# Patient Record
Sex: FEMALE | Race: WHITE | NOT HISPANIC OR LATINO | Employment: OTHER | URBAN - METROPOLITAN AREA
[De-identification: names, ages, dates, MRNs, and addresses within clinical notes are randomized per-mention and may not be internally consistent; named-entity substitution may affect disease eponyms.]

---

## 2019-04-08 ENCOUNTER — HOSPITAL ENCOUNTER (OUTPATIENT)
Dept: OTHER | Facility: HOSPITAL | Age: 74
Discharge: HOME | End: 2019-04-08
Attending: PHYSICIAN ASSISTANT

## 2019-04-08 LAB
T4 FREE SERPL-MCNC: 1.8 NG/DL (ref 0.9–1.8)
TSH SERPL-ACNC: 2.54 M[IU]/L (ref 0.27–4.2)

## 2019-04-09 LAB
CONV THYROGLOBULIN ANTIBODY: <1 IU/ML (ref 0–0.9)
THYROGLOB SERPL-MCNC: 0.1 NG/ML (ref 1.5–38.5)

## 2019-10-01 ENCOUNTER — OFFICE VISIT CONVERTED (OUTPATIENT)
Dept: SURGERY | Facility: CLINIC | Age: 74
End: 2019-10-01
Attending: SURGERY

## 2019-10-08 ENCOUNTER — HOSPITAL ENCOUNTER (OUTPATIENT)
Dept: OTHER | Facility: HOSPITAL | Age: 74
Discharge: HOME | End: 2019-10-08
Attending: PHYSICIAN ASSISTANT

## 2019-10-08 LAB
T4 FREE SERPL-MCNC: 1.9 NG/DL (ref 0.9–1.8)
TSH SERPL-ACNC: 1.96 M[IU]/L (ref 0.27–4.2)

## 2019-10-14 LAB — THYROGLOB SERPL-MCNC: <0.5 NG/ML (ref 1.3–31.8)

## 2020-01-17 ENCOUNTER — CONVERSION ENCOUNTER (OUTPATIENT)
Dept: FAMILY MEDICINE CLINIC | Facility: CLINIC | Age: 75
End: 2020-01-17

## 2020-01-17 ENCOUNTER — OFFICE VISIT CONVERTED (OUTPATIENT)
Dept: FAMILY MEDICINE CLINIC | Facility: CLINIC | Age: 75
End: 2020-01-17
Attending: FAMILY MEDICINE

## 2020-04-27 ENCOUNTER — TELEPHONE CONVERTED (OUTPATIENT)
Dept: FAMILY MEDICINE CLINIC | Facility: CLINIC | Age: 75
End: 2020-04-27
Attending: FAMILY MEDICINE

## 2020-05-04 ENCOUNTER — HOSPITAL ENCOUNTER (OUTPATIENT)
Dept: GENERAL RADIOLOGY | Facility: HOSPITAL | Age: 75
Discharge: HOME | End: 2020-05-04
Attending: PHYSICIAN ASSISTANT

## 2020-05-04 LAB
ALBUMIN SERPL-MCNC: 4.1 G/DL (ref 3.5–5)
ALBUMIN/GLOB SERPL: 1.4 {RATIO} (ref 1.4–2.6)
ALP SERPL-CCNC: 80 U/L (ref 43–160)
ALT SERPL-CCNC: 18 U/L (ref 10–40)
ANION GAP SERPL CALC-SCNC: 18 MMOL/L (ref 8–19)
AST SERPL-CCNC: 22 U/L (ref 15–50)
BILIRUB SERPL-MCNC: 0.4 MG/DL (ref 0.2–1.3)
BUN SERPL-MCNC: 17 MG/DL (ref 5–25)
BUN/CREAT SERPL: 21 {RATIO} (ref 6–20)
CALCIUM SERPL-MCNC: 8.9 MG/DL (ref 8.7–10.4)
CHLORIDE SERPL-SCNC: 99 MMOL/L (ref 99–111)
CHOLEST SERPL-MCNC: 170 MG/DL (ref 107–200)
CHOLEST/HDLC SERPL: 2.7 {RATIO} (ref 3–6)
CONV CO2: 26 MMOL/L (ref 22–32)
CONV CREATININE URINE, RANDOM: 36.1 MG/DL (ref 10–300)
CONV MICROALBUM.,U,RANDOM: <12 MG/L (ref 0–20)
CONV TOTAL PROTEIN: 7.1 G/DL (ref 6.3–8.2)
CREAT UR-MCNC: 0.8 MG/DL (ref 0.5–0.9)
EST. AVERAGE GLUCOSE BLD GHB EST-MCNC: 140 MG/DL
GFR SERPLBLD BASED ON 1.73 SQ M-ARVRAT: >60 ML/MIN/{1.73_M2}
GLOBULIN UR ELPH-MCNC: 3 G/DL (ref 2–3.5)
GLUCOSE SERPL-MCNC: 140 MG/DL (ref 65–99)
HBA1C MFR BLD: 6.5 % (ref 3.5–5.7)
HDLC SERPL-MCNC: 63 MG/DL (ref 40–60)
LDLC SERPL CALC-MCNC: 91 MG/DL (ref 70–100)
MICROALBUMIN/CREAT UR: 33.2 MG/G{CRE} (ref 0–35)
OSMOLALITY SERPL CALC.SUM OF ELEC: 292 MOSM/KG (ref 273–304)
POTASSIUM SERPL-SCNC: 4.2 MMOL/L (ref 3.5–5.3)
SODIUM SERPL-SCNC: 139 MMOL/L (ref 135–147)
T4 FREE SERPL-MCNC: 1.9 NG/DL (ref 0.9–1.8)
TRIGL SERPL-MCNC: 78 MG/DL (ref 40–150)
TSH SERPL-ACNC: 0.56 M[IU]/L (ref 0.27–4.2)
VLDLC SERPL-MCNC: 16 MG/DL (ref 5–37)

## 2020-05-09 LAB — THYROGLOB SERPL-MCNC: <0.5 NG/ML (ref 1.3–31.8)

## 2020-10-15 ENCOUNTER — TELEPHONE CONVERTED (OUTPATIENT)
Dept: FAMILY MEDICINE CLINIC | Facility: CLINIC | Age: 75
End: 2020-10-15
Attending: FAMILY MEDICINE

## 2021-04-05 ENCOUNTER — HOSPITAL ENCOUNTER (OUTPATIENT)
Dept: GENERAL RADIOLOGY | Facility: HOSPITAL | Age: 76
Discharge: HOME | End: 2021-04-05
Attending: PHYSICIAN ASSISTANT

## 2021-04-05 LAB
ALBUMIN SERPL-MCNC: 3.7 G/DL (ref 3.5–5)
ALBUMIN/GLOB SERPL: 1.2 {RATIO} (ref 1.4–2.6)
ALP SERPL-CCNC: 73 U/L (ref 43–160)
ALT SERPL-CCNC: 17 U/L (ref 10–40)
ANION GAP SERPL CALC-SCNC: 12 MMOL/L (ref 8–19)
AST SERPL-CCNC: 21 U/L (ref 15–50)
BILIRUB SERPL-MCNC: 0.33 MG/DL (ref 0.2–1.3)
BUN SERPL-MCNC: 21 MG/DL (ref 5–25)
BUN/CREAT SERPL: 22 {RATIO} (ref 6–20)
CALCIUM SERPL-MCNC: 9 MG/DL (ref 8.7–10.4)
CHLORIDE SERPL-SCNC: 103 MMOL/L (ref 99–111)
CHOLEST SERPL-MCNC: 168 MG/DL (ref 107–200)
CHOLEST/HDLC SERPL: 2.5 {RATIO} (ref 3–6)
CONV CO2: 28 MMOL/L (ref 22–32)
CONV TOTAL PROTEIN: 6.7 G/DL (ref 6.3–8.2)
CREAT UR-MCNC: 0.96 MG/DL (ref 0.5–0.9)
EST. AVERAGE GLUCOSE BLD GHB EST-MCNC: 126 MG/DL
GFR SERPLBLD BASED ON 1.73 SQ M-ARVRAT: 58 ML/MIN/{1.73_M2}
GLOBULIN UR ELPH-MCNC: 3 G/DL (ref 2–3.5)
GLUCOSE SERPL-MCNC: 132 MG/DL (ref 65–99)
HBA1C MFR BLD: 6 % (ref 3.5–5.7)
HDLC SERPL-MCNC: 67 MG/DL (ref 40–60)
LDLC SERPL CALC-MCNC: 86 MG/DL (ref 70–100)
OSMOLALITY SERPL CALC.SUM OF ELEC: 293 MOSM/KG (ref 273–304)
POTASSIUM SERPL-SCNC: 4.1 MMOL/L (ref 3.5–5.3)
SODIUM SERPL-SCNC: 139 MMOL/L (ref 135–147)
T4 FREE SERPL-MCNC: 1.9 NG/DL (ref 0.9–1.8)
TRIGL SERPL-MCNC: 77 MG/DL (ref 40–150)
TSH SERPL-ACNC: 0.52 M[IU]/L (ref 0.27–4.2)
VLDLC SERPL-MCNC: 15 MG/DL (ref 5–37)

## 2021-04-11 LAB — THYROGLOB SERPL-MCNC: <0.5 NG/ML (ref 1.3–31.8)

## 2021-05-03 ENCOUNTER — HOSPITAL ENCOUNTER (OUTPATIENT)
Dept: GENERAL RADIOLOGY | Facility: HOSPITAL | Age: 76
Discharge: HOME | End: 2021-05-03
Attending: PHYSICIAN ASSISTANT

## 2021-05-03 LAB
T4 FREE SERPL-MCNC: 1.7 NG/DL (ref 0.9–1.8)
TSH SERPL-ACNC: 2.27 M[IU]/L (ref 0.27–4.2)

## 2021-05-12 NOTE — PROGRESS NOTES
Quick Note      Patient Name: Chinyere Fuchs   Patient ID: [id number]   Sex: Female   YOB: 1945    Primary Care Provider: Ney Nicole DO   Referring Provider: Ney Nicole DO    Visit Date: April 27, 2020    Provider: Ney Nicole DO   Location: [facility]   Location Address: [address] [place] [address]  [address] [place] [address]  [id number]   Location Phone: [phone]          History Of Present Illness  TELEHEALTH TELEPHONE VISIT  Chief Complaint: sinus pain pressure, HTN med refill   Chinyere Fuchs is a 74 year old /White female who is presenting for evaluation via telehealth telephone visit. Verbal consent obtained before beginning visit.   Provider spent 11 minutes with the patient during telehealth visit.   The following staff were present during this visit: Ney Nicole DO   Past Medical History/Overview of Patient Symptoms       Patient with phone call visit (attempt at teleconference on [employer] not able to go through and no email listed), complaining of allergy symptoms was last seen 1/2020.  PMH significant for HTN T2DM on Actos Ozempic Tradjenta as well as losartan/HCTZ.  Blood pressure was better than previous with some weight loss back in January of this year.  She is not prescribed any allergy medicine and when she takes that over-the-counter which she says she does    No chest pain palpitation shortness of breath fever just mild sinus drainage similar to anytime she is had before    Her medical records came in in February after being seen from Dr. Oreilly and Dr. Coon, had labs 4/2019 LDL was 114 metabolic panel otherwise good, urine microalbumin on 1/2019 35 A1c 7.4.    She has had imaging of her abdomen 11/2019 that noted hepatic cirrhosis mild splenomegaly which suggest portal hypertension    Mammogram found 2004, no bone scan or DEXA, had an EGD for esophageal varices 12/2019  Colonoscopy on 6/2018 with several polyps tubular  adenoma           Assessment  · Hypothyroidism     244.9/E03.9  · Allergic rhinitis, chronic     477.9  · HTN (hypertension)     401.9/I10  · T2DM (type 2 diabetes mellitus)     250.00/E11.9  · HLD (hyperlipidemia)     272.4/E78.5  · NAFLD (nonalcoholic fatty liver disease)     571.8/K76.0  · Screening for colon cancer     V76.51/Z12.11         T2DM  NAFLD  HTN  *Stable  Continue losartan and HCTZ will renew today  Advised monitor blood pressure at home follow-up as scheduled sooner if concerns  Labs were ordered from previous appointment in January can reorder if needed CMP A1c urine microalbumin lipid  Reviewed abdomen imaging shows hepatic cirrhosis and has a history of esophageal varices, recommend following up with GI and continue with Actos to help    Chronic allergic rhinitis  Sinusitis  *Acute on chronic  Continue with allergy medicines  Recommend amoxicillin to treat follow-up with no improvement    Recommend annual on his follow-up as scheduled labs before no DEXA in records, last mammogram in [facility] 2004 colonoscopy 2018 repeat 3 to 5 years or sooner     Problems Reconciled  Plan  · Orders  o Thyroid Profile (TSH, FT4) (02435, 75444, THYII) - 244.9/E03.9 - 04/27/2020  o Physician Telephone Evaluation, 11-20 minutes (95677) - 477.9, 250.00/E11.9, 272.4/E78.5, 571.8/K76.0 - 04/27/2020  o Diabetes 2 Panel (Urine Microalbumin, CMP, Lipid, A1c, ) [facility] (19023, 97256, 53410, 20994) - 250.00/E11.9, 272.4/E78.5 - 04/27/2020   to schedule lab apt or perform prior to AWV in next 3 months or less, if duplicated from previous labs cancel   o ACO-19: Colorectal cancer screening results documented and reviewed (3017F) - V76.51/Z12.11 - 04/27/2020 6/2018, x2 polyps repeat 3 years or sooner  · Medications  o amoxicillin 500 mg oral tablet   SIG: take 1 tablet (500 mg) by oral route every 12 hours for 10 days   DISP: (20) tablets with 0 refills  Prescribed on 04/27/2020     o hydrochlorothiazide 12.5 mg oral tablet    SIG: take 1 tablet (12.5 mg) by oral route once daily for 90 days   DISP: (90) tablets with 3 refills  Prescribed on 04/27/2020     o losartan 50 mg oral tablet   SIG: take 1 tablet (50 mg) by oral route once daily for 90 days   DISP: (90) tablets with 3 refills  Prescribed on 04/27/2020     o Medications have been Reconciled  · Instructions  o Plan Of Care:   o Chronic conditions reviewed and taken into consideration for today's treatment plan.  o Patient instructed to seek medical attention urgently for new or worsening symptoms.  o Patient is taking medications as prescribed and doing well.   o Take all medications as prescribed/directed.  o Patient instructed/educated on their diet and exercise program.  o Risks, benefits, and alternatives were discussed with the patient. The patient is aware of risks associated with:  o Discussed Covid-19 precautions including, but not limited to, social distancing, avoid touching your face, and hand washing.   · Disposition  o Call or Return if symptoms worsen or persist.  o Labs before follow up ordered  o Appointment Requested (36042) and Recall created (27976)  Careprovider : Ney Nicole DO ([id number])  Location : [facility]  Appointment : Annual Exam / Office Visit  Date : 3 months +/- 2 days  Override : No  Comments/Instructions : AWV if able, labs before reordered            Electronically Signed by: Ney Nicole DO -Author on April 27, 2020 01:55:54 PM

## 2021-05-13 NOTE — PROGRESS NOTES
Progress Note      Patient Name: Chinyere Fuchs   Patient ID: [id number]   Sex: Female   YOB: 1945    Primary Care Provider: Ney Nicole DO   Referring Provider: Ney Nicole DO    Visit Date: October 15, 2020    Provider: Ney Nicole DO   Location: [facility]   Location Address: [address] [place] [address]  [id number]   Location Phone: [phone]          Chief Complaint  · sinus pressure pain      History Of Present Illness  TELEHEALTH TELEPHONE VISIT  Chinyere Fuchs is a 74 year old /White female who is presenting for evaluation via telehealth telephone visit. Verbal consent obtained before beginning visit.   Provider spent 8 minutes with patient during telehealth visit.   The following staff were present during this visit: Ney Nicole DO   Past Medical History/Overview of Patient Symptoms  Chinyere Fuchs is a 74 year old /White female who presents for evaluation and treatment of:        Patient comes in planing of sinus pain and pressure has had for almost a week now and no improvement with over-the-counter medicines and taking the allergy medicines that she has.  She has past medical history significant for diabetes which puts her at high risk and denies any sick contacts fever chest pain loss of taste or smell       Past Medical History  Disease Name Date Onset Notes   Allergic rhinitis, chronic --  --    Bone Density Screening Refused --    Cataract --  --    HLD (hyperlipidemia) --  --    HTN (hypertension) --  --    Hypothyroidism --  --    Hypothyroidism (acquired) --  s/p total thyroidectomy 2015   OA (osteoarthritis) --  --    Obesity (BMI 30-39.9) --  --    Screening Mammogram Refused --    T2DM (type 2 diabetes mellitus) --  --          Past Surgical History  Procedure Name Date Notes   Cataract exctraction with lens implant 2012 --    Colonoscopy Refused --    EYE SURGERY 1-5-12 and 1-19-12 Patient  Lens Implant   Hysterectomy 2014 --    Knee repair 10/29/2013 R-knee   Thyroidectomy, Total 09/15/2015 --          Medication List  Name Date Started Instructions   Actos 15 mg oral tablet 2020 take 1 tablet (15 mg) by oral route once daily for 90 days   albuterol sulfate 90 mcg/actuation inhalation HFA aerosol inhaler 2020 inhale 2 puffs (180 mcg) by inhalation route every 4 hours as needed   hydrochlorothiazide 12.5 mg oral tablet 2020 take 1 tablet (12.5 mg) by oral route once daily for 90 days   levothyroxine 175 mcg oral tablet  take 1 tablet (175 mcg) by oral route once daily on an empty stomach 30 minutes before breakfast   losartan 50 mg oral tablet 2020 take 1 tablet (50 mg) by oral route once daily for 90 days   metoprolol tartrate 25 mg oral tablet 2020 take 1 tablet (25 mg) by oral route once daily for 30 days   Ozempic 0.25 mg or 0.5 mg(2 mg/1.5 mL) subcutaneous pen injector 2020 inject 0.5 milliliter by subcutaneous route once a week   Tradjenta 5 mg oral tablet 2020 take 1 tablet (5 mg) by oral route once daily for 90 days   Vision Formula-2 [id number] mg-unit-mg-mg oral capsule  take 1 capsule by oral route daily         Allergy List  Allergen Name Date Reaction Notes   Codiene --  --  --        Allergies Reconciled  Family Medical History  Disease Name Relative/Age Notes   Congestive Heart Failure Mother/   --    -Father's Family History Unknown Father/   Father         Reproductive History  Menstrual   Menopause Status: Postmenopausal Age Menopause: 50   Pregnancy Summary   Total Pregnancies: 1 Full Term: 1 Premature: 0   Ab Induced: 0 Ab Spontaneous: 0 Ectopics: 0   Multiples: 0 Livin         Social History  Finding Status Start/Stop Quantity Notes   Active but no formal exercise --  --/-- --  --    Advance Care Plan/Surrogate Decision Maker scanned into EMR --  --/-- --  --    Alcohol Never --/-- --  drinks no   Caffeine Current every day  --/-- --  drinks regularly; coffee; 1-2 times per day   Denies substance abuse --  --/-- --  --    Living will in place --  --/-- --  --    Second hand smoke exposure Never --/-- --  no   Tobacco Never --/-- --  never a smoker         Immunizations  NameDate Admin Mfg Trade Name Lot Number Route Inj VIS Given VIS Publication   Gbmholfct93/30/2019 SKB Fluarix, quadrivalent, preservative free [id number] NE NE 01/17/2020    Comments:    Rpvbuyfvqoif26/25/2014 [facility] PREVNAR 13 [id number] IM LD 11/25/2014    Comments: pt tolerated well         Review of Systems  · Constitutional  o Denies  o : fever, fatigue, weight loss, weight gain  · HENT  o Admits  o : sinus pain, nasal congestion, nasal discharge, postnasal drip  · Cardiovascular  o Denies  o : lower extremity edema, claudication, chest pressure, palpitations  · Respiratory  o Denies  o : shortness of breath, wheezing, cough, hemoptysis, dyspnea on exertion  · Gastrointestinal  o Denies  o : nausea, vomiting, diarrhea, constipation, abdominal pain  · Integument  o Denies  o : rash, itching  · Psychiatric  o Denies  o : anxiety, depression          Assessment  · Allergic rhinitis due to allergen     477.9/J30.9  · Sinusitis     473.9/J32.9  · T2DM (type 2 diabetes mellitus)     250.00/E11.9         Sinusitis  Chronic allergic rhinitis  Acute on chronic  Treat with amoxicillin  No fevers or sick contacts loss of taste smell and no chest pain so low likelihood of Covid, still precautions given advised to follow-up if there are concerns or findings as well as symptom changes  Follow-up if no improvement denies fever or other    T2DM  *Managing  Continue medicines and monitor blood sugars 164 this morning stable close monitoring given acute infection       Plan  · Orders  o ACO-39: Current medications updated and reviewed (1159F, ) - - 10/15/2020  alanis Duckworth Telephone evaluation, 5-10 min (28892) - 473.9/J32.9, 477.9/J30.9, 250.00/E11.9 -  10/15/2020  · Medications  o amoxicillin 875 mg oral tablet   SIG: take 1 tablet (875 mg) by oral route every 12 hours for 10 days   DISP: (20) Tablet with 0 refills  Prescribed on 10/15/2020     o Medications have been Reconciled  · Instructions  o Chronic conditions reviewed and taken into consideration for today's treatment plan.  o Patient instructed to seek medical attention urgently for new or worsening symptoms.  o Patient was educated/instructed on their diagnosis, treatment and medications prior to discharge from the clinic today.  o Risks, benefits, and alternatives were discussed with the patient. The patient is aware of risks associated with:  o Discussed Covid-19 precautions including, but not limited to, social distancing, avoid touching your face, and hand washing.   o Trusted Web sites were provided.  o Bring all medicines with their bottles to each office visit.  o Electronically Identified Patient Education Materials Provided Electronically  · Disposition  o Call or Return if symptoms worsen or persist.  o as scheduled  o Follow up later in week if no better            Electronically Signed by: Ney Nicole, DO -Author on October 15, 2020 10:36:58 AM

## 2021-05-15 VITALS
BODY MASS INDEX: 39.65 KG/M2 | HEART RATE: 79 BPM | SYSTOLIC BLOOD PRESSURE: 138 MMHG | RESPIRATION RATE: 16 BRPM | HEIGHT: 61 IN | OXYGEN SATURATION: 98 % | DIASTOLIC BLOOD PRESSURE: 76 MMHG | WEIGHT: 210 LBS

## 2021-05-15 VITALS — HEIGHT: 61 IN | WEIGHT: 214 LBS | BODY MASS INDEX: 40.4 KG/M2

## 2021-06-09 ENCOUNTER — TELEPHONE (OUTPATIENT)
Dept: FAMILY MEDICINE CLINIC | Facility: CLINIC | Age: 76
End: 2021-06-09

## 2021-06-09 NOTE — TELEPHONE ENCOUNTER
Caller: Chinyere Fuchs    Relationship: Self    Best call back number: [phone]    Medication needed: OZEMPIC - INJECT FOR DIABETES      When do you need the refill by: PATIENT IS OUT        Does the patient have less than a 3 day supply:  [x] Yes  [] No    What is the patient's preferred pharmacy:  [facility] [place], [place] - [facility] [address].  [phone] [facility] [phone]   [phone]

## 2021-06-10 RX ORDER — SEMAGLUTIDE 1.34 MG/ML
INJECTION, SOLUTION SUBCUTANEOUS
Qty: 1.5 ML | Refills: 1 | Status: SHIPPED | OUTPATIENT
Start: 2021-06-10 | End: 2021-07-12

## 2021-07-12 RX ORDER — PIOGLITAZONEHYDROCHLORIDE 15 MG/1
TABLET ORAL
Qty: 90 TABLET | Refills: 1 | Status: SHIPPED | OUTPATIENT
Start: 2021-07-12 | End: 2021-10-19

## 2021-07-12 RX ORDER — SEMAGLUTIDE 1.34 MG/ML
INJECTION, SOLUTION SUBCUTANEOUS
Qty: 1.5 ML | Refills: 1 | Status: SHIPPED | OUTPATIENT
Start: 2021-07-12 | End: 2021-07-26 | Stop reason: SDUPTHER

## 2021-07-12 RX ORDER — HYDROCHLOROTHIAZIDE 12.5 MG/1
TABLET ORAL
Qty: 90 TABLET | Refills: 3 | Status: SHIPPED | OUTPATIENT
Start: 2021-07-12 | End: 2022-07-11

## 2021-07-12 RX ORDER — LOSARTAN POTASSIUM 50 MG/1
TABLET ORAL
Qty: 90 TABLET | Refills: 3 | Status: SHIPPED | OUTPATIENT
Start: 2021-07-12

## 2021-07-26 RX ORDER — SEMAGLUTIDE 1.34 MG/ML
0.5 INJECTION, SOLUTION SUBCUTANEOUS WEEKLY
Qty: 1.5 ML | Refills: 2 | Status: SHIPPED | OUTPATIENT
Start: 2021-07-26 | End: 2021-12-20

## 2021-07-26 NOTE — TELEPHONE ENCOUNTER
DELETE AFTER REVIEWING: Send the encounter HIGH priority, If patient has less than a 3 day supply. If the patient will run out of medication over the weekend add that information to the additional details line. Send this encounter to the clinical pool.    Caller: Chinyere Fuchs    Relationship: Self    Best call back number:[phone]  Medication needed:   Requested Prescriptions     Pending Prescriptions Disp Refills   • Semaglutide,0.25 or 0.5MG/DOS, (Ozempic, 0.25 or 0.5 MG/DOSE,) 2 MG/1.5ML solution pen-injector 1.5 mL 1       When do you need the refill by: ASAP       What is the patient's preferred pharmacy:    [facility] - [place], [place] - [facility] [address] [facility].  [phone] [facility] [phone]   [phone]

## 2021-10-04 RX ORDER — ALBUTEROL SULFATE 90 UG/1
AEROSOL, METERED RESPIRATORY (INHALATION)
Qty: 18 G | Refills: 2 | Status: SHIPPED | OUTPATIENT
Start: 2021-10-04 | End: 2022-05-17

## 2021-10-04 NOTE — TELEPHONE ENCOUNTER
albuteral inhaler is not on med list, patient last seen on 10/15/2021 tele.please advise thank you

## 2021-10-14 ENCOUNTER — LAB (OUTPATIENT)
Dept: LAB | Facility: HOSPITAL | Age: 76
End: 2021-10-14

## 2021-10-14 ENCOUNTER — TRANSCRIBE ORDERS (OUTPATIENT)
Dept: ADMINISTRATIVE | Facility: HOSPITAL | Age: 76
End: 2021-10-14

## 2021-10-14 DIAGNOSIS — E03.9 HYPOTHYROIDISM, UNSPECIFIED TYPE: ICD-10-CM

## 2021-10-14 DIAGNOSIS — E03.9 HYPOTHYROIDISM, UNSPECIFIED TYPE: Primary | ICD-10-CM

## 2021-10-14 LAB
T3FREE SERPL-MCNC: 2.04 PG/ML (ref 2–4.4)
T4 FREE SERPL-MCNC: 1.68 NG/DL (ref 0.93–1.7)
TSH SERPL DL<=0.05 MIU/L-ACNC: 3.7 UIU/ML (ref 0.27–4.2)

## 2021-10-14 PROCEDURE — 36415 COLL VENOUS BLD VENIPUNCTURE: CPT

## 2021-10-14 PROCEDURE — 84481 FREE ASSAY (FT-3): CPT

## 2021-10-14 PROCEDURE — 84443 ASSAY THYROID STIM HORMONE: CPT

## 2021-10-14 PROCEDURE — 84439 ASSAY OF FREE THYROXINE: CPT

## 2021-10-19 RX ORDER — PIOGLITAZONEHYDROCHLORIDE 15 MG/1
TABLET ORAL
Qty: 90 TABLET | Refills: 1 | Status: SHIPPED | OUTPATIENT
Start: 2021-10-19

## 2021-11-23 ENCOUNTER — TRANSCRIBE ORDERS (OUTPATIENT)
Dept: LAB | Facility: HOSPITAL | Age: 76
End: 2021-11-23

## 2021-11-23 ENCOUNTER — LAB (OUTPATIENT)
Dept: LAB | Facility: HOSPITAL | Age: 76
End: 2021-11-23

## 2021-11-23 DIAGNOSIS — Z85.850 HX OF THYROID CANCER: Primary | ICD-10-CM

## 2021-11-23 DIAGNOSIS — Z85.850 HX OF THYROID CANCER: ICD-10-CM

## 2021-11-23 LAB
T4 FREE SERPL-MCNC: 1.89 NG/DL (ref 0.93–1.7)
TSH SERPL DL<=0.05 MIU/L-ACNC: 1.37 UIU/ML (ref 0.27–4.2)

## 2021-11-23 PROCEDURE — 84443 ASSAY THYROID STIM HORMONE: CPT

## 2021-11-23 PROCEDURE — 84432 ASSAY OF THYROGLOBULIN: CPT

## 2021-11-23 PROCEDURE — 36415 COLL VENOUS BLD VENIPUNCTURE: CPT

## 2021-11-23 PROCEDURE — 84439 ASSAY OF FREE THYROXINE: CPT

## 2021-11-29 LAB — THYROGLOB SERPL-MCNC: <2 NG/ML

## 2021-11-29 RX ORDER — LINAGLIPTIN 5 MG/1
TABLET, FILM COATED ORAL
Qty: 30 TABLET | Refills: 1 | Status: SHIPPED | OUTPATIENT
Start: 2021-11-29 | End: 2022-01-31

## 2021-12-20 RX ORDER — SEMAGLUTIDE 1.34 MG/ML
0.5 INJECTION, SOLUTION SUBCUTANEOUS WEEKLY
Qty: 1.5 ML | Refills: 2 | Status: SHIPPED | OUTPATIENT
Start: 2021-12-20 | End: 2022-02-16 | Stop reason: SDUPTHER

## 2022-01-31 RX ORDER — LINAGLIPTIN 5 MG/1
TABLET, FILM COATED ORAL
Qty: 30 TABLET | Refills: 1 | Status: SHIPPED | OUTPATIENT
Start: 2022-01-31 | End: 2022-04-05

## 2022-01-31 NOTE — TELEPHONE ENCOUNTER
Patient has not been seen for over a year. I called her and she is going to make a apt can you refill for 30 days?

## 2022-02-16 ENCOUNTER — OFFICE VISIT (OUTPATIENT)
Dept: FAMILY MEDICINE CLINIC | Facility: CLINIC | Age: 77
End: 2022-02-16

## 2022-02-16 VITALS
SYSTOLIC BLOOD PRESSURE: 132 MMHG | TEMPERATURE: 97.7 F | DIASTOLIC BLOOD PRESSURE: 68 MMHG | HEART RATE: 82 BPM | OXYGEN SATURATION: 99 % | HEIGHT: 61 IN | WEIGHT: 209.6 LBS | BODY MASS INDEX: 39.57 KG/M2

## 2022-02-16 DIAGNOSIS — E78.2 MIXED HYPERLIPIDEMIA: ICD-10-CM

## 2022-02-16 DIAGNOSIS — Z00.00 ENCOUNTER FOR SUBSEQUENT ANNUAL WELLNESS VISIT (AWV) IN MEDICARE PATIENT: Primary | ICD-10-CM

## 2022-02-16 DIAGNOSIS — E66.9 OBESITY (BMI 30-39.9): ICD-10-CM

## 2022-02-16 DIAGNOSIS — E03.9 HYPOTHYROIDISM (ACQUIRED): ICD-10-CM

## 2022-02-16 DIAGNOSIS — I10 PRIMARY HYPERTENSION: Chronic | ICD-10-CM

## 2022-02-16 DIAGNOSIS — E11.65 TYPE 2 DIABETES MELLITUS WITH HYPERGLYCEMIA, WITHOUT LONG-TERM CURRENT USE OF INSULIN: Chronic | ICD-10-CM

## 2022-02-16 PROBLEM — E78.5 HLD (HYPERLIPIDEMIA): Status: ACTIVE | Noted: 2022-02-16

## 2022-02-16 PROBLEM — M19.90 OA (OSTEOARTHRITIS): Status: ACTIVE | Noted: 2022-02-16

## 2022-02-16 PROBLEM — J30.9 ALLERGIC RHINITIS: Status: ACTIVE | Noted: 2022-02-16

## 2022-02-16 PROBLEM — E11.9 T2DM (TYPE 2 DIABETES MELLITUS): Status: ACTIVE | Noted: 2022-02-16

## 2022-02-16 PROCEDURE — 1159F MED LIST DOCD IN RCRD: CPT | Performed by: FAMILY MEDICINE

## 2022-02-16 PROCEDURE — G0439 PPPS, SUBSEQ VISIT: HCPCS | Performed by: FAMILY MEDICINE

## 2022-02-16 PROCEDURE — 1170F FXNL STATUS ASSESSED: CPT | Performed by: FAMILY MEDICINE

## 2022-02-16 RX ORDER — SEMAGLUTIDE 1.34 MG/ML
0.5 INJECTION, SOLUTION SUBCUTANEOUS WEEKLY
Qty: 3 PEN | Refills: 1 | Status: SHIPPED | OUTPATIENT
Start: 2022-02-16 | End: 2022-09-05

## 2022-02-16 RX ORDER — LEVOTHYROXINE SODIUM 175 UG/1
175 TABLET ORAL DAILY
COMMUNITY

## 2022-02-16 RX ORDER — BLOOD SUGAR DIAGNOSTIC
STRIP MISCELLANEOUS
COMMUNITY
Start: 2021-12-20 | End: 2022-04-27

## 2022-02-16 NOTE — PROGRESS NOTES
The ABCs of the Annual Wellness Visit  Subsequent Medicare Wellness Visit    Chief Complaint   Patient presents with   • Medicare Wellness-subsequent   • Earache     both ears   • Med Refill      Subjective    History of Present Illness:  Chinyere Fuchs is a 76 y.o. female who presents for a Subsequent Medicare Wellness Visit.    The following portions of the patient's history were reviewed and   updated as appropriate: allergies, current medications, past family history, past medical history, past social history, past surgical history and problem list.    Compared to one year ago, the patient feels her physical   health is better.    Compared to one year ago, the patient feels her mental   health is better.    Recent Hospitalizations:  She was not admitted to the hospital during the last year.       Current Medical Providers:  Patient Care Team:  Ney Nicole DO as PCP - General (Family Medicine)    Outpatient Medications Prior to Visit   Medication Sig Dispense Refill   • albuterol sulfate  (90 Base) MCG/ACT inhaler INHALE 2 PUFFS BY MOUTH EVERY 4 HOURS AS NEEDED 18 g 2   • hydroCHLOROthiazide (HYDRODIURIL) 12.5 MG tablet TAKE ONE TABLET BY MOUTH ONCE DAILY 90 tablet 3   • levothyroxine (SYNTHROID, LEVOTHROID) 175 MCG tablet Take 175 mcg by mouth Daily.     • losartan (COZAAR) 50 MG tablet TAKE ONE TABLET BY MOUTH ONCE DAILY 90 tablet 3   • pioglitazone (ACTOS) 15 MG tablet TAKE ONE TABLET BY MOUTH ONCE DAILY 90 tablet 1   • Tradjenta 5 MG tablet tablet TAKE ONE TABLET BY MOUTH ONCE DAILY 30 tablet 1   • metoprolol tartrate (LOPRESSOR) 25 MG tablet TAKE ONE TABLET BY MOUTH ONCE DAILY 90 tablet 1   • Ozempic, 0.25 or 0.5 MG/DOSE, 2 MG/1.5ML solution pen-injector INJECT 0.5 MG UNDER THE SKIN INTO THE APPROPRIATE AREA AS DIRECTED 1 (ONE) TIME PER WEEK FOR 84 DAYS. 1.5 mL 2   • Prodigy No Coding Blood Gluc test strip        No facility-administered medications prior to visit.       No opioid  "medication identified on active medication list. I have reviewed chart for other potential  high risk medication/s and harmful drug interactions in the elderly.          Aspirin is not on active medication list.  Aspirin use is indicated based on review of current medical condition/s. Pros and cons of this therapy have been discussed with this patient. Benefits of this medication outweigh potential harm.  Patient has been instructed to start taking this medication..    Patient Active Problem List   Diagnosis   • HLD (hyperlipidemia)   • HTN (hypertension)   • Hypothyroidism (acquired)   • OA (osteoarthritis)   • Obesity (BMI 30-39.9)   • T2DM (type 2 diabetes mellitus) (HCC)   • Allergic rhinitis   • Encounter for subsequent annual wellness visit (AWV) in Medicare patient     Advance Care Planning  Advance Directive is on file.  ACP discussion was held with the patient during this visit. Patient has an advance directive in EMR which is still valid.           Objective    Vitals:    02/16/22 1307   BP: 132/68   BP Location: Right arm   Patient Position: Sitting   Pulse: 82   Temp: 97.7 °F (36.5 °C)   SpO2: 99%   Weight: 95.1 kg (209 lb 9.6 oz)   Height: 154.9 cm (61\")   PainSc: 0-No pain     BMI Readings from Last 1 Encounters:   02/16/22 39.60 kg/m²   BMI is above normal parameters. Recommendations include: educational material and exercise counseling    Does the patient have evidence of cognitive impairment? No    Physical Exam  Vitals reviewed.   Constitutional:       Appearance: Normal appearance. She is well-developed.   HENT:      Head: Normocephalic and atraumatic.      Right Ear: External ear normal.      Left Ear: External ear normal.      Mouth/Throat:      Pharynx: No oropharyngeal exudate.   Eyes:      Conjunctiva/sclera: Conjunctivae normal.      Pupils: Pupils are equal, round, and reactive to light.   Cardiovascular:      Rate and Rhythm: Normal rate.   Pulmonary:      Effort: Pulmonary effort is " normal.   Abdominal:      General: Abdomen is flat. There is no distension.      Palpations: Abdomen is soft.   Skin:     General: Skin is warm and dry.   Neurological:      General: No focal deficit present.      Mental Status: She is alert and oriented to person, place, and time.   Psychiatric:         Mood and Affect: Mood and affect normal.         Behavior: Behavior normal.         Thought Content: Thought content normal.         Judgment: Judgment normal.       Lab Results   Component Value Date    TRIG 109 02/25/2022    HDL 60 02/25/2022     (H) 02/25/2022    VLDL 19 02/25/2022    HGBA1C 6.30 (H) 02/25/2022            HEALTH RISK ASSESSMENT    Smoking Status:  Social History     Tobacco Use   Smoking Status Never Smoker   Smokeless Tobacco Never Used     Alcohol Consumption:  Social History     Substance and Sexual Activity   Alcohol Use None     Fall Risk Screen:    RAKESHADI Fall Risk Assessment was completed, and patient is at LOW risk for falls.Assessment completed on:2/16/2022    Depression Screening:  PHQ-2/PHQ-9 Depression Screening 2/16/2022   Little interest or pleasure in doing things 0   Feeling down, depressed, or hopeless 0   Total Score 0       Health Habits and Functional and Cognitive Screening:  Functional & Cognitive Status 2/16/2022   Do you have difficulty preparing food and eating? No   Do you have difficulty bathing yourself, getting dressed or grooming yourself? No   Do you have difficulty using the toilet? No   Do you have difficulty moving around from place to place? No   Do you have trouble with steps or getting out of a bed or a chair? No   Current Diet Well Balanced Diet   Dental Exam Up to date   Eye Exam Up to date   Exercise (times per week) 7 times per week   Current Exercises Include Stationary Bicycling/Spin Class   Do you need help using the phone?  No   Are you deaf or do you have serious difficulty hearing?  No   Do you need help with transportation? No   Do you need  help shopping? No   Do you need help preparing meals?  No   Do you need help with housework?  No   Do you need help with laundry? No   Do you need help taking your medications? No   Do you need help managing money? No   Do you ever drive or ride in a car without wearing a seat belt? No   Have you felt unusual stress, anger or loneliness in the last month? No   Who do you live with? Spouse   If you need help, do you have trouble finding someone available to you? No   Have you been bothered in the last four weeks by sexual problems? No   Do you have difficulty concentrating, remembering or making decisions? No       Age-appropriate Screening Schedule:  Refer to the list below for future screening recommendations based on patient's age, sex and/or medical conditions. Orders for these recommended tests are listed in the plan section. The patient has been provided with a written plan.    Health Maintenance   Topic Date Due   • TDAP/TD VACCINES (1 - Tdap) Never done   • ZOSTER VACCINE (2 of 3) 01/05/2010   • DIABETIC EYE EXAM  06/09/2021   • DXA SCAN  03/16/2022 (Originally 1945)   • HEMOGLOBIN A1C  08/25/2022   • LIPID PANEL  02/25/2023   • URINE MICROALBUMIN  02/25/2023   • INFLUENZA VACCINE  Completed              Assessment/Plan   CMS Preventative Services Quick Reference  Risk Factors Identified During Encounter  Discussed vaccinations for patient age appropriate and monitor chronic conditions such as diabetes renewed her medications ordered standing labs to monitor her condition such as A1c CMP lipid  The above risks/problems have been discussed with the patient.  Follow up actions/plans if indicated are seen below in the Assessment/Plan Section.  Pertinent information has been shared with the patient in the After Visit Summary.    Diagnoses and all orders for this visit:    1. Encounter for subsequent annual wellness visit (AWV) in Medicare patient (Primary)    2. Type 2 diabetes mellitus with hyperglycemia,  without long-term current use of insulin (HCC)  -     metoprolol tartrate (LOPRESSOR) 25 MG tablet; Take 1 tablet by mouth Daily.  Dispense: 90 tablet; Refill: 1  -     Semaglutide,0.25 or 0.5MG/DOS, (Ozempic, 0.25 or 0.5 MG/DOSE,) 2 MG/1.5ML solution pen-injector; Inject 0.5 mg under the skin into the appropriate area as directed 1 (One) Time Per Week for 180 days.  Dispense: 3 pen; Refill: 1  -     Hemoglobin A1c; Standing  -     TSH+Free T4; Standing  -     CBC (No Diff); Standing  -     Vitamin B12; Standing  -     Folate; Standing  -     Comprehensive metabolic panel; Standing  -     Lipid panel; Standing  -     Microalbumin / Creatinine Urine Ratio - Urine, Clean Catch; Standing    3. Primary hypertension  -     metoprolol tartrate (LOPRESSOR) 25 MG tablet; Take 1 tablet by mouth Daily.  Dispense: 90 tablet; Refill: 1  -     Hemoglobin A1c; Standing  -     TSH+Free T4; Standing  -     CBC (No Diff); Standing  -     Vitamin B12; Standing  -     Folate; Standing  -     Comprehensive metabolic panel; Standing  -     Lipid panel; Standing  -     Microalbumin / Creatinine Urine Ratio - Urine, Clean Catch; Standing    4. Hypothyroidism (acquired)  -     metoprolol tartrate (LOPRESSOR) 25 MG tablet; Take 1 tablet by mouth Daily.  Dispense: 90 tablet; Refill: 1  -     Hemoglobin A1c; Standing  -     TSH+Free T4; Standing  -     CBC (No Diff); Standing  -     Vitamin B12; Standing  -     Folate; Standing  -     Comprehensive metabolic panel; Standing  -     Lipid panel; Standing  -     Microalbumin / Creatinine Urine Ratio - Urine, Clean Catch; Standing    5. Mixed hyperlipidemia  -     metoprolol tartrate (LOPRESSOR) 25 MG tablet; Take 1 tablet by mouth Daily.  Dispense: 90 tablet; Refill: 1  -     Hemoglobin A1c; Standing  -     TSH+Free T4; Standing  -     CBC (No Diff); Standing  -     Vitamin B12; Standing  -     Folate; Standing  -     Comprehensive metabolic panel; Standing  -     Lipid panel; Standing  -      Microalbumin / Creatinine Urine Ratio - Urine, Clean Catch; Standing    6. Obesity (BMI 30-39.9)  -     metoprolol tartrate (LOPRESSOR) 25 MG tablet; Take 1 tablet by mouth Daily.  Dispense: 90 tablet; Refill: 1  -     Hemoglobin A1c; Standing  -     TSH+Free T4; Standing  -     CBC (No Diff); Standing  -     Vitamin B12; Standing  -     Folate; Standing  -     Comprehensive metabolic panel; Standing  -     Lipid panel; Standing  -     Microalbumin / Creatinine Urine Ratio - Urine, Clean Catch; Standing        Follow Up:   Return in about 6 months (around 8/16/2022), or if symptoms worsen or fail to improve, for Labs before, Recheck.     An After Visit Summary and PPPS were made available to the patient.

## 2022-02-25 ENCOUNTER — LAB (OUTPATIENT)
Dept: LAB | Facility: HOSPITAL | Age: 77
End: 2022-02-25

## 2022-02-25 ENCOUNTER — TRANSCRIBE ORDERS (OUTPATIENT)
Dept: LAB | Facility: HOSPITAL | Age: 77
End: 2022-02-25

## 2022-02-25 DIAGNOSIS — E11.65 TYPE 2 DIABETES MELLITUS WITH HYPERGLYCEMIA, WITHOUT LONG-TERM CURRENT USE OF INSULIN: ICD-10-CM

## 2022-02-25 DIAGNOSIS — E66.9 OBESITY (BMI 30-39.9): ICD-10-CM

## 2022-02-25 DIAGNOSIS — E03.9 HYPOTHYROIDISM, UNSPECIFIED TYPE: Primary | ICD-10-CM

## 2022-02-25 DIAGNOSIS — E03.9 HYPOTHYROIDISM, UNSPECIFIED TYPE: ICD-10-CM

## 2022-02-25 DIAGNOSIS — I10 PRIMARY HYPERTENSION: ICD-10-CM

## 2022-02-25 DIAGNOSIS — E03.9 HYPOTHYROIDISM (ACQUIRED): ICD-10-CM

## 2022-02-25 DIAGNOSIS — E78.2 MIXED HYPERLIPIDEMIA: ICD-10-CM

## 2022-02-25 LAB
ALBUMIN SERPL-MCNC: 4.1 G/DL (ref 3.5–5.2)
ALBUMIN/GLOB SERPL: 1.6 G/DL
ALP SERPL-CCNC: 76 U/L (ref 39–117)
ALT SERPL W P-5'-P-CCNC: 12 U/L (ref 1–33)
ANION GAP SERPL CALCULATED.3IONS-SCNC: 13.6 MMOL/L (ref 5–15)
AST SERPL-CCNC: 18 U/L (ref 1–32)
BILIRUB SERPL-MCNC: 0.3 MG/DL (ref 0–1.2)
BUN SERPL-MCNC: 21 MG/DL (ref 8–23)
BUN/CREAT SERPL: 21 (ref 7–25)
CALCIUM SPEC-SCNC: 8.8 MG/DL (ref 8.6–10.5)
CHLORIDE SERPL-SCNC: 100 MMOL/L (ref 98–107)
CHOLEST SERPL-MCNC: 184 MG/DL (ref 0–200)
CO2 SERPL-SCNC: 24.4 MMOL/L (ref 22–29)
CREAT SERPL-MCNC: 1 MG/DL (ref 0.57–1)
DEPRECATED RDW RBC AUTO: 40.7 FL (ref 37–54)
ERYTHROCYTE [DISTWIDTH] IN BLOOD BY AUTOMATED COUNT: 12.2 % (ref 12.3–15.4)
GFR SERPL CREATININE-BSD FRML MDRD: 54 ML/MIN/1.73
GLOBULIN UR ELPH-MCNC: 2.5 GM/DL
GLUCOSE SERPL-MCNC: 139 MG/DL (ref 65–99)
HCT VFR BLD AUTO: 43.4 % (ref 34–46.6)
HDLC SERPL-MCNC: 60 MG/DL (ref 40–60)
HGB BLD-MCNC: 14.4 G/DL (ref 12–15.9)
LDLC SERPL CALC-MCNC: 105 MG/DL (ref 0–100)
LDLC/HDLC SERPL: 1.7 {RATIO}
MCH RBC QN AUTO: 30.4 PG (ref 26.6–33)
MCHC RBC AUTO-ENTMCNC: 33.2 G/DL (ref 31.5–35.7)
MCV RBC AUTO: 91.8 FL (ref 79–97)
PLATELET # BLD AUTO: 223 10*3/MM3 (ref 140–450)
PMV BLD AUTO: 10.4 FL (ref 6–12)
POTASSIUM SERPL-SCNC: 4.2 MMOL/L (ref 3.5–5.2)
PROT SERPL-MCNC: 6.6 G/DL (ref 6–8.5)
RBC # BLD AUTO: 4.73 10*6/MM3 (ref 3.77–5.28)
SODIUM SERPL-SCNC: 138 MMOL/L (ref 136–145)
T3FREE SERPL-MCNC: 2.25 PG/ML (ref 2–4.4)
T4 FREE SERPL-MCNC: 1.82 NG/DL (ref 0.93–1.7)
TRIGL SERPL-MCNC: 109 MG/DL (ref 0–150)
TSH SERPL DL<=0.05 MIU/L-ACNC: 1.17 UIU/ML (ref 0.27–4.2)
VLDLC SERPL-MCNC: 19 MG/DL (ref 5–40)
WBC NRBC COR # BLD: 6.76 10*3/MM3 (ref 3.4–10.8)

## 2022-02-25 PROCEDURE — 84443 ASSAY THYROID STIM HORMONE: CPT

## 2022-02-25 PROCEDURE — 80053 COMPREHEN METABOLIC PANEL: CPT

## 2022-02-25 PROCEDURE — 84481 FREE ASSAY (FT-3): CPT

## 2022-02-25 PROCEDURE — 85027 COMPLETE CBC AUTOMATED: CPT

## 2022-02-25 PROCEDURE — 36415 COLL VENOUS BLD VENIPUNCTURE: CPT

## 2022-02-25 PROCEDURE — 82746 ASSAY OF FOLIC ACID SERUM: CPT

## 2022-02-25 PROCEDURE — 82043 UR ALBUMIN QUANTITATIVE: CPT

## 2022-02-25 PROCEDURE — 80061 LIPID PANEL: CPT

## 2022-02-25 PROCEDURE — 82570 ASSAY OF URINE CREATININE: CPT

## 2022-02-25 PROCEDURE — 82607 VITAMIN B-12: CPT

## 2022-02-25 PROCEDURE — 84439 ASSAY OF FREE THYROXINE: CPT

## 2022-02-25 PROCEDURE — 83036 HEMOGLOBIN GLYCOSYLATED A1C: CPT

## 2022-02-26 LAB
ALBUMIN UR-MCNC: <1.2 MG/DL
CREAT UR-MCNC: 93.4 MG/DL
FOLATE SERPL-MCNC: 15.6 NG/ML (ref 4.78–24.2)
HBA1C MFR BLD: 6.3 % (ref 4.8–5.6)
MICROALBUMIN/CREAT UR: NORMAL MG/G{CREAT}
VIT B12 BLD-MCNC: 301 PG/ML (ref 211–946)

## 2022-02-28 PROBLEM — Z00.00 ENCOUNTER FOR SUBSEQUENT ANNUAL WELLNESS VISIT (AWV) IN MEDICARE PATIENT: Status: ACTIVE | Noted: 2022-02-28

## 2022-03-02 ENCOUNTER — TELEPHONE (OUTPATIENT)
Dept: FAMILY MEDICINE CLINIC | Facility: CLINIC | Age: 77
End: 2022-03-02

## 2022-03-02 NOTE — TELEPHONE ENCOUNTER
----- Message from Ney Nicole DO sent at 2/28/2022  7:49 AM EST -----  All your labs were good except B12 was low normal     Recommend taking supplement over-the-counter 1000 mcg a day

## 2022-03-09 ENCOUNTER — TELEPHONE (OUTPATIENT)
Dept: FAMILY MEDICINE CLINIC | Facility: CLINIC | Age: 77
End: 2022-03-09

## 2022-03-09 NOTE — TELEPHONE ENCOUNTER
Caller: Chinyere Fuchs    Relationship: Self    Best call back number: [phone]    Caller requesting test results: PATIENT    What test was performed: A1C    When was the test performed: 02/25/2022

## 2022-04-05 RX ORDER — LINAGLIPTIN 5 MG/1
TABLET, FILM COATED ORAL
Qty: 30 TABLET | Refills: 1 | Status: SHIPPED | OUTPATIENT
Start: 2022-04-05

## 2022-04-27 DIAGNOSIS — E66.9 OBESITY (BMI 30-39.9): ICD-10-CM

## 2022-04-27 DIAGNOSIS — I10 PRIMARY HYPERTENSION: Chronic | ICD-10-CM

## 2022-04-27 DIAGNOSIS — E03.9 HYPOTHYROIDISM (ACQUIRED): ICD-10-CM

## 2022-04-27 DIAGNOSIS — E11.65 TYPE 2 DIABETES MELLITUS WITH HYPERGLYCEMIA, WITHOUT LONG-TERM CURRENT USE OF INSULIN: Chronic | ICD-10-CM

## 2022-04-27 DIAGNOSIS — E78.2 MIXED HYPERLIPIDEMIA: ICD-10-CM

## 2022-04-27 RX ORDER — BLOOD SUGAR DIAGNOSTIC
STRIP MISCELLANEOUS
Qty: 50 EACH | Refills: 3 | Status: SHIPPED | OUTPATIENT
Start: 2022-04-27

## 2022-05-17 RX ORDER — ALBUTEROL SULFATE 90 UG/1
AEROSOL, METERED RESPIRATORY (INHALATION)
Qty: 18 G | Refills: 2 | Status: SHIPPED | OUTPATIENT
Start: 2022-05-17

## 2022-07-11 RX ORDER — HYDROCHLOROTHIAZIDE 12.5 MG/1
TABLET ORAL
Qty: 90 TABLET | Refills: 3 | Status: SHIPPED | OUTPATIENT
Start: 2022-07-11

## 2022-07-15 ENCOUNTER — LAB (OUTPATIENT)
Dept: LAB | Facility: HOSPITAL | Age: 77
End: 2022-07-15

## 2022-07-15 ENCOUNTER — TRANSCRIBE ORDERS (OUTPATIENT)
Dept: LAB | Facility: HOSPITAL | Age: 77
End: 2022-07-15

## 2022-07-15 DIAGNOSIS — Z85.850 HX OF THYROID CANCER: ICD-10-CM

## 2022-07-15 DIAGNOSIS — E05.80 THYROTOXICOSIS DUE TO ACUTE THYROIDITIS: ICD-10-CM

## 2022-07-15 DIAGNOSIS — Z85.850 HX OF THYROID CANCER: Primary | ICD-10-CM

## 2022-07-15 DIAGNOSIS — E06.0 THYROTOXICOSIS DUE TO ACUTE THYROIDITIS: ICD-10-CM

## 2022-07-15 LAB
T4 FREE SERPL-MCNC: 1.87 NG/DL (ref 0.93–1.7)
TSH SERPL DL<=0.05 MIU/L-ACNC: 0.57 UIU/ML (ref 0.27–4.2)

## 2022-07-15 PROCEDURE — 84443 ASSAY THYROID STIM HORMONE: CPT

## 2022-07-15 PROCEDURE — 36415 COLL VENOUS BLD VENIPUNCTURE: CPT

## 2022-07-15 PROCEDURE — 84432 ASSAY OF THYROGLOBULIN: CPT

## 2022-07-15 PROCEDURE — 84439 ASSAY OF FREE THYROXINE: CPT

## 2022-07-23 LAB — THYROGLOB SERPL-MCNC: <2 NG/ML

## 2022-08-09 RX ORDER — LOSARTAN POTASSIUM 25 MG/1
TABLET ORAL
Qty: 60 TABLET | Refills: 1 | Status: SHIPPED | OUTPATIENT
Start: 2022-08-09

## 2022-08-30 DIAGNOSIS — E11.65 TYPE 2 DIABETES MELLITUS WITH HYPERGLYCEMIA, WITHOUT LONG-TERM CURRENT USE OF INSULIN: Chronic | ICD-10-CM

## 2022-09-02 NOTE — TELEPHONE ENCOUNTER
Refill request.  I did not see this on patient's medication list. Last seen on 02/16/22 for armando.

## 2022-09-05 RX ORDER — SEMAGLUTIDE 1.34 MG/ML
0.5 INJECTION, SOLUTION SUBCUTANEOUS WEEKLY
Qty: 1.5 ML | Refills: 1 | Status: SHIPPED | OUTPATIENT
Start: 2022-09-05 | End: 2023-03-04

## 2023-01-18 ENCOUNTER — TRANSCRIBE ORDERS (OUTPATIENT)
Dept: LAB | Facility: HOSPITAL | Age: 78
End: 2023-01-18
Payer: MEDICARE

## 2023-01-18 ENCOUNTER — LAB (OUTPATIENT)
Dept: LAB | Facility: HOSPITAL | Age: 78
End: 2023-01-18
Payer: MEDICARE

## 2023-01-18 DIAGNOSIS — E03.9 ACQUIRED HYPOTHYROIDISM: ICD-10-CM

## 2023-01-18 DIAGNOSIS — E03.9 ACQUIRED HYPOTHYROIDISM: Primary | ICD-10-CM

## 2023-01-18 LAB
T4 FREE SERPL-MCNC: 1.84 NG/DL (ref 0.93–1.7)
TSH SERPL DL<=0.05 MIU/L-ACNC: 0.48 UIU/ML (ref 0.27–4.2)

## 2023-01-18 PROCEDURE — 36415 COLL VENOUS BLD VENIPUNCTURE: CPT

## 2023-01-18 PROCEDURE — 84443 ASSAY THYROID STIM HORMONE: CPT

## 2023-01-18 PROCEDURE — 84432 ASSAY OF THYROGLOBULIN: CPT

## 2023-01-18 PROCEDURE — 84439 ASSAY OF FREE THYROXINE: CPT

## 2023-01-24 LAB — THYROGLOB SERPL-MCNC: <2 NG/ML

## 2023-02-15 ENCOUNTER — LAB (OUTPATIENT)
Dept: LAB | Facility: HOSPITAL | Age: 78
End: 2023-02-15
Payer: MEDICARE

## 2023-02-15 ENCOUNTER — TRANSCRIBE ORDERS (OUTPATIENT)
Dept: LAB | Facility: HOSPITAL | Age: 78
End: 2023-02-15
Payer: MEDICARE

## 2023-02-15 DIAGNOSIS — R06.02 SHORTNESS OF BREATH: Primary | ICD-10-CM

## 2023-02-15 DIAGNOSIS — R06.02 SHORTNESS OF BREATH: ICD-10-CM

## 2023-02-15 PROCEDURE — 36415 COLL VENOUS BLD VENIPUNCTURE: CPT

## 2023-02-15 PROCEDURE — 83880 ASSAY OF NATRIURETIC PEPTIDE: CPT

## 2023-02-16 LAB — NT-PROBNP SERPL-MCNC: 150 PG/ML (ref 0–1800)

## 2023-03-28 ENCOUNTER — TELEPHONE (OUTPATIENT)
Dept: GASTROENTEROLOGY | Facility: CLINIC | Age: 78
End: 2023-03-28
Payer: MEDICARE

## 2023-03-28 NOTE — TELEPHONE ENCOUNTER
Called patient to offer sooner appointment from wait list. No answer - left message for a return call.    If patient returns call please transfer call to me.

## 2023-04-07 ENCOUNTER — TRANSCRIBE ORDERS (OUTPATIENT)
Dept: LAB | Facility: HOSPITAL | Age: 78
End: 2023-04-07
Payer: MEDICARE

## 2023-04-07 ENCOUNTER — LAB (OUTPATIENT)
Dept: LAB | Facility: HOSPITAL | Age: 78
End: 2023-04-07
Payer: MEDICARE

## 2023-04-07 DIAGNOSIS — E05.41: ICD-10-CM

## 2023-04-07 DIAGNOSIS — E05.41: Primary | ICD-10-CM

## 2023-04-07 LAB
T3FREE SERPL-MCNC: 2.05 PG/ML (ref 2–4.4)
T4 FREE SERPL-MCNC: 1.84 NG/DL (ref 0.93–1.7)
TSH SERPL DL<=0.05 MIU/L-ACNC: 3.53 UIU/ML (ref 0.27–4.2)

## 2023-04-07 PROCEDURE — 84481 FREE ASSAY (FT-3): CPT

## 2023-04-07 PROCEDURE — 84439 ASSAY OF FREE THYROXINE: CPT

## 2023-04-07 PROCEDURE — 36415 COLL VENOUS BLD VENIPUNCTURE: CPT

## 2023-04-07 PROCEDURE — 84443 ASSAY THYROID STIM HORMONE: CPT

## 2023-04-13 ENCOUNTER — OFFICE VISIT (OUTPATIENT)
Dept: GASTROENTEROLOGY | Facility: CLINIC | Age: 78
End: 2023-04-13
Payer: MEDICARE

## 2023-04-13 VITALS
BODY MASS INDEX: 43.59 KG/M2 | HEIGHT: 59 IN | SYSTOLIC BLOOD PRESSURE: 149 MMHG | WEIGHT: 216.2 LBS | DIASTOLIC BLOOD PRESSURE: 72 MMHG | HEART RATE: 70 BPM

## 2023-04-13 DIAGNOSIS — R10.32 LEFT LOWER QUADRANT ABDOMINAL PAIN: Primary | ICD-10-CM

## 2023-04-13 PROCEDURE — 3078F DIAST BP <80 MM HG: CPT | Performed by: NURSE PRACTITIONER

## 2023-04-13 PROCEDURE — 3077F SYST BP >= 140 MM HG: CPT | Performed by: NURSE PRACTITIONER

## 2023-04-13 PROCEDURE — 99203 OFFICE O/P NEW LOW 30 MIN: CPT | Performed by: NURSE PRACTITIONER

## 2023-04-13 RX ORDER — SEMAGLUTIDE 1.34 MG/ML
INJECTION, SOLUTION SUBCUTANEOUS
COMMUNITY
Start: 2023-03-23

## 2023-04-13 RX ORDER — LEVOTHYROXINE SODIUM 0.15 MG/1
TABLET ORAL
COMMUNITY
Start: 2023-03-03

## 2023-04-13 NOTE — PATIENT INSTRUCTIONS
Benefiber daily x 7 days then increase to 2 x day  Start Colace OTC stool softener every night  Call if any symptoms persist, or if you would like to pursue a colonoscopy.

## 2023-04-22 DIAGNOSIS — E78.2 MIXED HYPERLIPIDEMIA: ICD-10-CM

## 2023-04-22 DIAGNOSIS — E03.9 HYPOTHYROIDISM (ACQUIRED): ICD-10-CM

## 2023-04-22 DIAGNOSIS — E11.65 TYPE 2 DIABETES MELLITUS WITH HYPERGLYCEMIA, WITHOUT LONG-TERM CURRENT USE OF INSULIN: Chronic | ICD-10-CM

## 2023-04-22 DIAGNOSIS — E66.9 OBESITY (BMI 30-39.9): ICD-10-CM

## 2023-04-22 DIAGNOSIS — I10 PRIMARY HYPERTENSION: Chronic | ICD-10-CM

## 2023-05-20 NOTE — PROGRESS NOTES
"Patient Name: Chinyere Fuchs   Visit Date: 04/13/2023   Patient ID: [id number]  Provider: IWONA Fitch    Sex: female  Location:  Location Address:  Location Phone: [address]  [place] [address] [phone]    YOB: 1945  Age: 77 y.o.   Primary Care Provider Wesley Coon MD      Referring Provider: No ref. provider found        Chief Complaint  Abdominal Pain (Left center ABD pain, has improved within the last week )    History of Present Illness  New pt presents with c/o pain in left side and can radiate across to central abdomen. Sx started mid December, pt felt it started after taking Trulicity instead of Ozempic, she is back on Ozempic now. Pt states pain is not completely resolved, but not happening as often, occasionally feels a \"jab\" or \"burn\" she attributes improvement to being on Ozempic again.   Pain comes and goes, not r/t meals, feels more r/t position, can hurt to turn towards left if sitting.  Denies constipation, has BM most days, may only miss 1 day. Pain can be improved after BM at times.   No blood in stool, no unint wt loss.   Denies HB or indigestion, no dysphagia.     Pt takes Voltaren PRN arthritis.     Pt has never had a colonoscopy    CT abdomen and pelvis with contrast 4/4/2023: Pulmonary nodule noted, normal liver, atrophy of the pancreas no ductal dilation or masses seen, fat-containing umbilical or periumbilical ventral hernia noted measuring 1.4 cm, small distal splenic artery atherosclerotic aneurysm noted.  Patient saw primary care for results  Past Medical History:   Diagnosis Date   • Diabetes 2014   • HBP (high blood pressure)        Past Surgical History:   Procedure Laterality Date   • CATARACT EXTRACTION, BILATERAL     • HYSTERECTOMY  02/21/2014   • KNEE ARTHROPLASTY     • THYROIDECTOMY  2015       Allergies   Allergen Reactions   • Codeine Other (See Comments)     Elevated BP, then BP dropped       Family History   Problem " Mechanical thrombectomy was performed in the [facility] using a (CATHETER THRMB [id number] 24FR) catheter. "Relation Age of Onset   • Colon cancer Neg Hx         Social History     Tobacco Use   • Smoking status: Never   • Smokeless tobacco: Never   Vaping Use   • Vaping Use: Never used   Substance Use Topics   • Alcohol use: Never   • Drug use: Never       Objective     Vital Signs:   /72 (BP Location: Left arm, Patient Position: Sitting, Cuff Size: Adult)   Pulse 70   Ht 149.9 cm (59\")   Wt 98.1 kg (216 lb 3.2 oz)   BMI 43.67 kg/m²       Physical Exam  Constitutional:       General: The patient is not in acute distress.     Appearance: Normal appearance.   HENT:      Head: Normocephalic and atraumatic.      Nose: Nose normal.   Pulmonary:      Effort: Pulmonary effort is normal. No respiratory distress.   Abdominal:      General: Abdomen is flat.      Palpations: Abdomen is soft. There is no mass.      Tenderness: There is no abdominal tenderness. There is no guarding.   Musculoskeletal:      Cervical back: Neck supple.      Right lower leg: No edema.      Left lower leg: No edema.   Skin:     General: Skin is warm and dry.   Neurological:      General: No focal deficit present.      Mental Status: The patient is alert and oriented to person, place, and time.      Gait: Gait normal.   Psychiatric:         Mood and Affect: Mood normal.         Speech: Speech normal.         Behavior: Behavior normal.         Thought Content: Thought content normal.     Result Review :   The following data was reviewed by: IWONA Fitch on 04/13/2023:                    Assessment and Plan    Diagnoses and all orders for this visit:    1. Left lower quadrant abdominal pain (Primary)              Follow Up   Return if symptoms worsen or fail to improve.   I d/w pt that colonoscopy is recommended for lower abdominal pain, also reviewed with patient that even in the absence of pain colonoscopy is the best test to screen for colorectal cancer and it is recommended that she have a colonoscopy.  I discussed with " patient that although she has had a CT scan small lesions can be missed.  Patient verbalized understanding but she requested to wait at this time.  Benefiber daily x 7 days then increase to 2 x day  Start Colace OTC stool softener every night  Recommended to take NSAIDs sparingly  Pt requested to f/u as needed at this time, she will call if symptoms do not improve.    Patient was given instructions and counseling regarding her condition or for health maintenance advice. Please see specific information pulled into the AVS if appropriate.

## 2023-08-07 ENCOUNTER — TRANSCRIBE ORDERS (OUTPATIENT)
Dept: LAB | Facility: HOSPITAL | Age: 78
End: 2023-08-07
Payer: MEDICARE

## 2023-08-07 ENCOUNTER — LAB (OUTPATIENT)
Dept: LAB | Facility: HOSPITAL | Age: 78
End: 2023-08-07
Payer: MEDICARE

## 2023-08-07 DIAGNOSIS — I51.9 MYXEDEMA HEART DISEASE: Primary | ICD-10-CM

## 2023-08-07 DIAGNOSIS — E03.9 MYXEDEMA HEART DISEASE: ICD-10-CM

## 2023-08-07 DIAGNOSIS — E03.9 MYXEDEMA HEART DISEASE: Primary | ICD-10-CM

## 2023-08-07 DIAGNOSIS — I51.9 MYXEDEMA HEART DISEASE: ICD-10-CM

## 2023-08-07 LAB
T4 FREE SERPL-MCNC: 1.53 NG/DL (ref 0.93–1.7)
TSH SERPL DL<=0.05 MIU/L-ACNC: 5.72 UIU/ML (ref 0.27–4.2)

## 2023-08-07 PROCEDURE — 86800 THYROGLOBULIN ANTIBODY: CPT

## 2023-08-07 PROCEDURE — 84439 ASSAY OF FREE THYROXINE: CPT

## 2023-08-07 PROCEDURE — 36415 COLL VENOUS BLD VENIPUNCTURE: CPT

## 2023-08-07 PROCEDURE — 84443 ASSAY THYROID STIM HORMONE: CPT

## 2023-08-09 LAB — THYROGLOB AB SERPL-ACNC: <1 IU/ML (ref 0–0.9)

## 2023-09-18 ENCOUNTER — TRANSCRIBE ORDERS (OUTPATIENT)
Dept: LAB | Facility: HOSPITAL | Age: 78
End: 2023-09-18
Payer: MEDICARE

## 2023-09-18 ENCOUNTER — LAB (OUTPATIENT)
Dept: LAB | Facility: HOSPITAL | Age: 78
End: 2023-09-18
Payer: MEDICARE

## 2023-09-18 DIAGNOSIS — Z85.850 PERSONAL HISTORY OF MALIGNANT NEOPLASM OF THYROID: ICD-10-CM

## 2023-09-18 DIAGNOSIS — E03.9 HYPOTHYROIDISM, ADULT: Primary | ICD-10-CM

## 2023-09-18 DIAGNOSIS — E03.9 HYPOTHYROIDISM, ADULT: ICD-10-CM

## 2023-09-18 LAB
T3FREE SERPL-MCNC: 2.23 PG/ML (ref 2–4.4)
T4 FREE SERPL-MCNC: 1.64 NG/DL (ref 0.93–1.7)
TSH SERPL DL<=0.05 MIU/L-ACNC: 2.39 UIU/ML (ref 0.27–4.2)

## 2023-09-18 PROCEDURE — 84432 ASSAY OF THYROGLOBULIN: CPT

## 2023-09-18 PROCEDURE — 36415 COLL VENOUS BLD VENIPUNCTURE: CPT

## 2023-09-18 PROCEDURE — 84481 FREE ASSAY (FT-3): CPT

## 2023-09-18 PROCEDURE — 84443 ASSAY THYROID STIM HORMONE: CPT

## 2023-09-18 PROCEDURE — 84439 ASSAY OF FREE THYROXINE: CPT

## 2023-09-23 LAB — THYROGLOB SERPL-MCNC: <2 NG/ML

## 2024-08-14 ENCOUNTER — TRANSCRIBE ORDERS (OUTPATIENT)
Dept: LAB | Facility: HOSPITAL | Age: 79
End: 2024-08-14
Payer: MEDICARE

## 2024-08-14 ENCOUNTER — LAB (OUTPATIENT)
Dept: LAB | Facility: HOSPITAL | Age: 79
End: 2024-08-14
Payer: MEDICARE

## 2024-08-14 DIAGNOSIS — C73 MALIGNANT NEOPLASM OF THYROID GLAND: ICD-10-CM

## 2024-08-14 DIAGNOSIS — C73 MALIGNANT NEOPLASM OF THYROID GLAND: Primary | ICD-10-CM

## 2024-08-14 LAB
ANION GAP SERPL CALCULATED.3IONS-SCNC: 9 MMOL/L (ref 5–15)
BUN SERPL-MCNC: 20 MG/DL (ref 8–23)
BUN/CREAT SERPL: 23.5 (ref 7–25)
CALCIUM SPEC-SCNC: 8.8 MG/DL (ref 8.6–10.5)
CHLORIDE SERPL-SCNC: 100 MMOL/L (ref 98–107)
CO2 SERPL-SCNC: 27 MMOL/L (ref 22–29)
CREAT SERPL-MCNC: 0.85 MG/DL (ref 0.57–1)
EGFRCR SERPLBLD CKD-EPI 2021: 70.2 ML/MIN/1.73
GLUCOSE SERPL-MCNC: 161 MG/DL (ref 65–99)
POTASSIUM SERPL-SCNC: 4.2 MMOL/L (ref 3.5–5.2)
SODIUM SERPL-SCNC: 136 MMOL/L (ref 136–145)
T3FREE SERPL-MCNC: 2.74 PG/ML (ref 2–4.4)
T4 FREE SERPL-MCNC: 1.98 NG/DL (ref 0.92–1.68)
TSH SERPL DL<=0.05 MIU/L-ACNC: 0.38 UIU/ML (ref 0.27–4.2)

## 2024-08-14 PROCEDURE — 84432 ASSAY OF THYROGLOBULIN: CPT

## 2024-08-14 PROCEDURE — 80048 BASIC METABOLIC PNL TOTAL CA: CPT

## 2024-08-14 PROCEDURE — 84443 ASSAY THYROID STIM HORMONE: CPT

## 2024-08-14 PROCEDURE — 84439 ASSAY OF FREE THYROXINE: CPT

## 2024-08-14 PROCEDURE — 86800 THYROGLOBULIN ANTIBODY: CPT

## 2024-08-14 PROCEDURE — 84481 FREE ASSAY (FT-3): CPT

## 2024-08-14 PROCEDURE — 36415 COLL VENOUS BLD VENIPUNCTURE: CPT

## 2024-08-16 LAB
THYROGLOB AB SERPL-ACNC: <1 IU/ML (ref 0–0.9)
THYROGLOB SERPL-MCNC: 0.1 NG/ML (ref 1.5–38.5)

## 2024-09-27 ENCOUNTER — HOSPITAL ENCOUNTER (EMERGENCY)
Facility: HOSPITAL | Age: 79
Discharge: HOME | End: 2024-09-27
Attending: EMERGENCY MEDICINE
Payer: MEDICARE

## 2024-09-27 VITALS
BODY MASS INDEX: 39.65 KG/M2 | HEIGHT: 60 IN | TEMPERATURE: 97.8 F | DIASTOLIC BLOOD PRESSURE: 87 MMHG | SYSTOLIC BLOOD PRESSURE: 131 MMHG | OXYGEN SATURATION: 96 % | HEART RATE: 98 BPM | RESPIRATION RATE: 16 BRPM | WEIGHT: 201.94 LBS

## 2024-09-27 DIAGNOSIS — Z51.89 VISIT FOR WOUND CHECK: Primary | ICD-10-CM

## 2024-09-27 DIAGNOSIS — S30.1XXA ABDOMINAL WALL HEMATOMA, INITIAL ENCOUNTER: ICD-10-CM

## 2024-09-27 LAB
ALBUMIN SERPL-MCNC: 3.7 G/DL (ref 3.5–5.2)
ALBUMIN/GLOB SERPL: 1.2 G/DL
ALP SERPL-CCNC: 88 U/L (ref 39–117)
ALT SERPL W P-5'-P-CCNC: 49 U/L (ref 1–33)
ANION GAP SERPL CALCULATED.3IONS-SCNC: 10.6 MMOL/L (ref 5–15)
AST SERPL-CCNC: 44 U/L (ref 1–32)
BASOPHILS # BLD AUTO: 0.03 10*3/MM3 (ref 0–0.2)
BASOPHILS NFR BLD AUTO: 0.4 % (ref 0–1.5)
BILIRUB SERPL-MCNC: 0.8 MG/DL (ref 0–1.2)
BUN SERPL-MCNC: 11 MG/DL (ref 8–23)
BUN/CREAT SERPL: 14.3 (ref 7–25)
CALCIUM SPEC-SCNC: 8.9 MG/DL (ref 8.6–10.5)
CHLORIDE SERPL-SCNC: 101 MMOL/L (ref 98–107)
CO2 SERPL-SCNC: 27.4 MMOL/L (ref 22–29)
CREAT SERPL-MCNC: 0.77 MG/DL (ref 0.57–1)
DEPRECATED RDW RBC AUTO: 46.7 FL (ref 37–54)
EGFRCR SERPLBLD CKD-EPI 2021: 79.1 ML/MIN/1.73
EOSINOPHIL # BLD AUTO: 0.16 10*3/MM3 (ref 0–0.4)
EOSINOPHIL NFR BLD AUTO: 2.3 % (ref 0.3–6.2)
ERYTHROCYTE [DISTWIDTH] IN BLOOD BY AUTOMATED COUNT: 13.3 % (ref 12.3–15.4)
GLOBULIN UR ELPH-MCNC: 3.1 GM/DL
GLUCOSE SERPL-MCNC: 119 MG/DL (ref 65–99)
HCT VFR BLD AUTO: 41.3 % (ref 34–46.6)
HGB BLD-MCNC: 13.3 G/DL (ref 12–15.9)
IMM GRANULOCYTES # BLD AUTO: 0.02 10*3/MM3 (ref 0–0.05)
IMM GRANULOCYTES NFR BLD AUTO: 0.3 % (ref 0–0.5)
LYMPHOCYTES # BLD AUTO: 1.73 10*3/MM3 (ref 0.7–3.1)
LYMPHOCYTES NFR BLD AUTO: 24.5 % (ref 19.6–45.3)
MCH RBC QN AUTO: 30.6 PG (ref 26.6–33)
MCHC RBC AUTO-ENTMCNC: 32.2 G/DL (ref 31.5–35.7)
MCV RBC AUTO: 95.2 FL (ref 79–97)
MONOCYTES # BLD AUTO: 0.57 10*3/MM3 (ref 0.1–0.9)
MONOCYTES NFR BLD AUTO: 8.1 % (ref 5–12)
NEUTROPHILS NFR BLD AUTO: 4.54 10*3/MM3 (ref 1.7–7)
NEUTROPHILS NFR BLD AUTO: 64.4 % (ref 42.7–76)
NRBC BLD AUTO-RTO: 0 /100 WBC (ref 0–0.2)
PLATELET # BLD AUTO: 212 10*3/MM3 (ref 140–450)
PMV BLD AUTO: 9.3 FL (ref 6–12)
POTASSIUM SERPL-SCNC: 4 MMOL/L (ref 3.5–5.2)
PROT SERPL-MCNC: 6.8 G/DL (ref 6–8.5)
RBC # BLD AUTO: 4.34 10*6/MM3 (ref 3.77–5.28)
SODIUM SERPL-SCNC: 139 MMOL/L (ref 136–145)
WBC NRBC COR # BLD AUTO: 7.05 10*3/MM3 (ref 3.4–10.8)

## 2024-09-27 PROCEDURE — 80053 COMPREHEN METABOLIC PANEL: CPT

## 2024-09-27 PROCEDURE — 36415 COLL VENOUS BLD VENIPUNCTURE: CPT

## 2024-09-27 PROCEDURE — 99283 EMERGENCY DEPT VISIT LOW MDM: CPT

## 2024-09-27 PROCEDURE — 85025 COMPLETE CBC W/AUTO DIFF WBC: CPT

## 2024-09-27 NOTE — ED PROVIDER NOTES
Time: 3:15 PM EDT  Date of encounter:  9/27/2024  Independent Historian/Clinical History and Information was obtained by:   Patient    History is limited by: N/A    Chief Complaint   Patient presents with    Post-op Problem     Incision bleeding         History of Present Illness:  Patient is a 78 y.o. year old female who presents to the emergency department for evaluation of bleeding from surgical incision after cholecystectomy and hernia repair on Tuesday.  Patient reports she was able to take shower yesterday and noticed some dark blood on her bandage after that.  Patient reports today she noticed bright red blood since she removed the bandage and replaced it.  Denies any new blood on outside of bandage since replacing.  Reports some soreness after surgery but denies any pain.  Denies any nausea, vomiting, fevers chills or bodyaches.    Patient Care Team  Primary Care Provider: Wesley Coon MD    Past Medical History:     Allergies   Allergen Reactions    Codeine Other (See Comments)     Elevated BP, then BP dropped     Past Medical History:   Diagnosis Date    Diabetes 2014    HBP (high blood pressure)      Past Surgical History:   Procedure Laterality Date    CATARACT EXTRACTION, BILATERAL      HYSTERECTOMY  02/21/2014    KNEE ARTHROPLASTY      THYROIDECTOMY  2015     Family History   Problem Relation Age of Onset    Colon cancer Neg Hx        Home Medications:  Prior to Admission medications    Medication Sig Start Date End Date Taking? Authorizing Provider   albuterol sulfate  (90 Base) MCG/ACT inhaler INHALE 2 PUFFS BY MOUTH EVERY 4 HOURS AS NEEDED 5/17/22   Ney Nicole, DO   glucose blood (Prodigy No Coding Blood Gluc) test strip TEST BLOOD SUGAR ONCE DAILY 4/27/22   Ney Nicole, DO   hydroCHLOROthiazide (HYDRODIURIL) 12.5 MG tablet TAKE ONE TABLET BY MOUTH ONCE DAILY 7/11/22   Jovita Barrow, DO   levothyroxine (SYNTHROID, LEVOTHROID) 150 MCG tablet  3/3/23   Provider, MD Autumn  "  levothyroxine (SYNTHROID, LEVOTHROID) 175 MCG tablet Take 1 tablet by mouth Daily.  Patient not taking: Reported on 4/13/2023    Provider, MD Autumn   losartan (COZAAR) 25 MG tablet TAKE TWO TABLETS BY MOUTH ONCE DAILY 8/9/22   Ney Nicole DO   losartan (COZAAR) 50 MG tablet TAKE ONE TABLET BY MOUTH ONCE DAILY  Patient not taking: Reported on 4/13/2023 7/12/21   Ney Nicole DO   metoprolol tartrate (LOPRESSOR) 25 MG tablet TAKE ONE TABLET BY MOUTH ONCE DAILY 4/27/22   Ney Nicole DO   Ozempic, 0.25 or 0.5 MG/DOSE, 2 MG/1.5ML solution pen-injector  3/23/23   Provider, MD Autumn   pioglitazone (ACTOS) 15 MG tablet TAKE ONE TABLET BY MOUTH ONCE DAILY 10/19/21   Ney Nicole DO   Tradjenta 5 MG tablet tablet TAKE ONE TABLET BY MOUTH ONCE DAILY 4/5/22   Ney Nicole DO        Social History:   Social History     Tobacco Use    Smoking status: Never    Smokeless tobacco: Never   Vaping Use    Vaping status: Never Used   Substance Use Topics    Alcohol use: Never    Drug use: Never         Review of Systems:  Review of Systems   Skin:  Positive for wound.        Physical Exam:  /78 (BP Location: Left arm, Patient Position: Sitting)   Pulse (!) 124   Temp 97.8 °F (36.6 °C) (Oral)   Resp 18   Ht 152.4 cm (60\")   Wt 91.6 kg (201 lb 15.1 oz)   LMP  (LMP Unknown)   SpO2 99%   Breastfeeding No   BMI 39.44 kg/m²         Physical Exam  Vitals and nursing note reviewed.   Constitutional:       Appearance: Normal appearance.   HENT:      Head: Normocephalic and atraumatic.   Eyes:      General: No scleral icterus.  Cardiovascular:      Rate and Rhythm: Normal rate.   Pulmonary:      Effort: Pulmonary effort is normal.   Abdominal:      Palpations: Abdomen is soft.      Tenderness: There is no abdominal tenderness.      Comments: Well-healing surgical incisions noted.  Staples in place.  Appears to have underlying hematoma.  There was old blood noted.  No active bleeding.  No purulent drainage. "  See photo.   Musculoskeletal:         General: Normal range of motion.      Cervical back: Normal range of motion.   Skin:     Findings: No rash.   Neurological:      General: No focal deficit present.      Mental Status: She is alert.                      Procedures:  Procedures      Medical Decision Making:      Comorbidities that affect care:    Hypertension, Thyroid Disease    External Notes reviewed:    Reviewed note from 9/24/2024      The following orders were placed and all results were independently analyzed by me:  Orders Placed This Encounter   Procedures    Comprehensive Metabolic Panel    CBC Auto Differential    IP General Consult (Use specialty-specific consult if known)    CBC & Differential       Medications Given in the Emergency Department:  Medications - No data to display     ED Course:    The patient was initially evaluated in the triage area where orders were placed. The patient was later dispositioned by Dakota Garcia MD.      The patient was advised to stay for completion of workup which includes but is not limited to communication of labs and radiological results, reassessment and plan. The patient was advised that leaving prior to disposition by a provider could result in critical findings that are not communicated to the patient.     ED Course as of 09/27/24 1658   Fri Sep 27, 2024   1517   --- PROVIDER IN TRIAGE NOTE ---    The patient was evaluated by Jacinta landa in triage. Orders were placed and the patient is currently awaiting disposition.      [CE]   1658 Try to speak with her surgeon.  He is currently an emergent case.  This appears to be postop hematoma.  Left a message for him that they will have the patient follow-up as an outpatient. [MA]      ED Course User Index  [CE] Jacinta Katz, APRN  [MA] Dakota Garcia MD       Labs:    Lab Results (last 24 hours)       Procedure Component Value Units Date/Time    CBC & Differential [[id number]]  (Normal) Collected:  09/27/24 1522    Specimen: Blood Updated: 09/27/24 1527    Narrative:      The following orders were created for panel order CBC & Differential.  Procedure                               Abnormality         Status                     ---------                               -----------         ------                     CBC Auto Differential[[id number]]        Normal              Final result                 Please view results for these tests on the individual orders.    Comprehensive Metabolic Panel [[id number]]  (Abnormal) Collected: 09/27/24 1522    Specimen: Blood Updated: 09/27/24 1548     Glucose 119 mg/dL      BUN 11 mg/dL      Creatinine 0.77 mg/dL      Sodium 139 mmol/L      Potassium 4.0 mmol/L      Chloride 101 mmol/L      CO2 27.4 mmol/L      Calcium 8.9 mg/dL      Total Protein 6.8 g/dL      Albumin 3.7 g/dL      ALT (SGPT) 49 U/L      AST (SGOT) 44 U/L      Alkaline Phosphatase 88 U/L      Total Bilirubin 0.8 mg/dL      Globulin 3.1 gm/dL      A/G Ratio 1.2 g/dL      BUN/Creatinine Ratio 14.3     Anion Gap 10.6 mmol/L      eGFR 79.1 mL/min/1.73     Narrative:      GFR Normal >60  Chronic Kidney Disease <60  Kidney Failure <15    The GFR formula is only valid for adults with stable renal function between ages 18 and 70.    CBC Auto Differential [[id number]]  (Normal) Collected: 09/27/24 1522    Specimen: Blood Updated: 09/27/24 1527     WBC 7.05 10*3/mm3      RBC 4.34 10*6/mm3      Hemoglobin 13.3 g/dL      Hematocrit 41.3 %      MCV 95.2 fL      MCH 30.6 pg      MCHC 32.2 g/dL      RDW 13.3 %      RDW-SD 46.7 fl      MPV 9.3 fL      Platelets 212 10*3/mm3      Neutrophil % 64.4 %      Lymphocyte % 24.5 %      Monocyte % 8.1 %      Eosinophil % 2.3 %      Basophil % 0.4 %      Immature Grans % 0.3 %      Neutrophils, Absolute 4.54 10*3/mm3      Lymphocytes, Absolute 1.73 10*3/mm3      Monocytes, Absolute 0.57 10*3/mm3      Eosinophils, Absolute 0.16 10*3/mm3      Basophils, Absolute 0.03 10*3/mm3       Immature Grans, Absolute 0.02 10*3/mm3      nRBC 0.0 /100 WBC              Imaging:    No Radiology Exams Resulted Within Past 24 Hours      Differential Diagnosis and Discussion:      Wound Evaluation: Differential diagnosis includes but is not limited to laceration, abrasion, puncture, burn, ulcer, cellulitis, abscess, vasculitis, malignancy, and rash.    All labs were reviewed and interpreted by me.    MDM       Patient is a 78-year-old female who just had surgery a couple days ago.  Presents with wound dressing that was leaking.  Appear to be a postop hematoma with old blood.  I did clean the wound and there is no current bleeding at this time.  There is a large contusion noted to the area.  Is not actively bleeding at this time.  Hemoglobin is stable.  Okay for outpatient follow-up.            Patient Care Considerations:          Consultants/Shared Management Plan:    I try to speak with the patient's surgeon but he was unavailable as he is in the operating room.  Left a message for him that the patient will follow-up    Social Determinants of Health:    Patient is independent, reliable, and has access to care.       Disposition and Care Coordination:    Discharged: The patient is suitable and stable for discharge with no need for consideration of admission.    I have explained the patient´s condition, diagnoses and treatment plan based on the information available to me at this time. I have answered questions and addressed any concerns. The patient has a good  understanding of the patient´s diagnosis, condition, and treatment plan as can be expected at this point. The vital signs have been stable. The patient´s condition is stable and appropriate for discharge from the emergency department.      The patient will pursue further outpatient evaluation with the primary care physician or other designated or consulting physician as outlined in the discharge instructions. They are agreeable to this plan of care and  follow-up instructions have been explained in detail. The patient has received these instructions in written format and have expressed an understanding of the discharge instructions. The patient is aware that any significant change in condition or worsening of symptoms should prompt an immediate return to this or the closest emergency department or call to 911.      Final diagnoses:   Visit for wound check   Abdominal wall hematoma, initial encounter        ED Disposition       ED Disposition   Discharge    Condition   Stable    Comment   --               This medical record created using voice recognition software.             Dakota Garcia MD  09/27/24 2450

## 2025-02-10 ENCOUNTER — LAB (OUTPATIENT)
Dept: LAB | Facility: HOSPITAL | Age: 80
End: 2025-02-10
Payer: MEDICARE

## 2025-02-10 ENCOUNTER — TRANSCRIBE ORDERS (OUTPATIENT)
Dept: LAB | Facility: HOSPITAL | Age: 80
End: 2025-02-10
Payer: MEDICARE

## 2025-02-10 DIAGNOSIS — C73 MALIGNANT NEOPLASM OF THYROID GLAND: ICD-10-CM

## 2025-02-10 DIAGNOSIS — C73 MALIGNANT NEOPLASM OF THYROID GLAND: Primary | ICD-10-CM

## 2025-02-10 LAB
ALBUMIN SERPL-MCNC: 3.8 G/DL (ref 3.5–5.2)
ALBUMIN/GLOB SERPL: 1.4 G/DL
ALP SERPL-CCNC: 82 U/L (ref 39–117)
ALT SERPL W P-5'-P-CCNC: 13 U/L (ref 1–33)
ANION GAP SERPL CALCULATED.3IONS-SCNC: 5.2 MMOL/L (ref 5–15)
AST SERPL-CCNC: 25 U/L (ref 1–32)
BILIRUB SERPL-MCNC: 0.3 MG/DL (ref 0–1.2)
BUN SERPL-MCNC: 27 MG/DL (ref 8–23)
BUN/CREAT SERPL: 22.5 (ref 7–25)
CALCIUM SPEC-SCNC: 8.5 MG/DL (ref 8.6–10.5)
CHLORIDE SERPL-SCNC: 103 MMOL/L (ref 98–107)
CO2 SERPL-SCNC: 27.8 MMOL/L (ref 22–29)
CREAT SERPL-MCNC: 1.2 MG/DL (ref 0.57–1)
EGFRCR SERPLBLD CKD-EPI 2021: 46.1 ML/MIN/1.73
GLOBULIN UR ELPH-MCNC: 2.7 GM/DL
GLUCOSE SERPL-MCNC: 105 MG/DL (ref 65–99)
POTASSIUM SERPL-SCNC: 4.8 MMOL/L (ref 3.5–5.2)
PROT SERPL-MCNC: 6.5 G/DL (ref 6–8.5)
SODIUM SERPL-SCNC: 136 MMOL/L (ref 136–145)
T4 FREE SERPL-MCNC: 1.51 NG/DL (ref 0.92–1.68)
TSH SERPL DL<=0.05 MIU/L-ACNC: 2.59 UIU/ML (ref 0.27–4.2)

## 2025-02-10 PROCEDURE — 36415 COLL VENOUS BLD VENIPUNCTURE: CPT

## 2025-02-10 PROCEDURE — 80053 COMPREHEN METABOLIC PANEL: CPT

## 2025-02-10 PROCEDURE — 84443 ASSAY THYROID STIM HORMONE: CPT

## 2025-02-10 PROCEDURE — 84439 ASSAY OF FREE THYROXINE: CPT

## 2025-03-13 ENCOUNTER — APPOINTMENT (OUTPATIENT)
Dept: CT IMAGING | Facility: HOSPITAL | Age: 80
End: 2025-03-13
Payer: MEDICARE

## 2025-03-13 ENCOUNTER — HOSPITAL ENCOUNTER (EMERGENCY)
Facility: HOSPITAL | Age: 80
Discharge: HOME | End: 2025-03-13
Attending: EMERGENCY MEDICINE
Payer: MEDICARE

## 2025-03-13 VITALS
HEART RATE: 78 BPM | OXYGEN SATURATION: 100 % | BODY MASS INDEX: 39.42 KG/M2 | TEMPERATURE: 97.7 F | RESPIRATION RATE: 18 BRPM | HEIGHT: 59 IN | DIASTOLIC BLOOD PRESSURE: 89 MMHG | SYSTOLIC BLOOD PRESSURE: 176 MMHG | WEIGHT: 195.55 LBS

## 2025-03-13 DIAGNOSIS — R10.33 PERIUMBILICAL ABDOMINAL PAIN: Primary | ICD-10-CM

## 2025-03-13 DIAGNOSIS — R10.31 RIGHT LOWER QUADRANT ABDOMINAL PAIN: ICD-10-CM

## 2025-03-13 DIAGNOSIS — R91.1 PULMONARY NODULE: ICD-10-CM

## 2025-03-13 LAB
ALBUMIN SERPL-MCNC: 3.7 G/DL (ref 3.5–5.2)
ALBUMIN/GLOB SERPL: 1.4 G/DL
ALP SERPL-CCNC: 93 U/L (ref 39–117)
ALT SERPL W P-5'-P-CCNC: 21 U/L (ref 1–33)
ANION GAP SERPL CALCULATED.3IONS-SCNC: 10.9 MMOL/L (ref 5–15)
AST SERPL-CCNC: 23 U/L (ref 1–32)
BACTERIA UR QL AUTO: ABNORMAL /HPF
BASOPHILS # BLD AUTO: 0.04 10*3/MM3 (ref 0–0.2)
BASOPHILS NFR BLD AUTO: 0.6 % (ref 0–1.5)
BILIRUB SERPL-MCNC: 0.4 MG/DL (ref 0–1.2)
BILIRUB UR QL STRIP: NEGATIVE
BUN SERPL-MCNC: 26 MG/DL (ref 8–23)
BUN/CREAT SERPL: 31.3 (ref 7–25)
CALCIUM SPEC-SCNC: 8.2 MG/DL (ref 8.6–10.5)
CHLORIDE SERPL-SCNC: 104 MMOL/L (ref 98–107)
CLARITY UR: CLEAR
CO2 SERPL-SCNC: 25.1 MMOL/L (ref 22–29)
COLOR UR: YELLOW
CREAT SERPL-MCNC: 0.83 MG/DL (ref 0.57–1)
CRP SERPL-MCNC: <0.3 MG/DL (ref 0–0.5)
DEPRECATED RDW RBC AUTO: 45.2 FL (ref 37–54)
EGFRCR SERPLBLD CKD-EPI 2021: 71.8 ML/MIN/1.73
EOSINOPHIL # BLD AUTO: 0.07 10*3/MM3 (ref 0–0.4)
EOSINOPHIL NFR BLD AUTO: 1.1 % (ref 0.3–6.2)
ERYTHROCYTE [DISTWIDTH] IN BLOOD BY AUTOMATED COUNT: 13.2 % (ref 12.3–15.4)
ERYTHROCYTE [SEDIMENTATION RATE] IN BLOOD: 23 MM/HR (ref 0–30)
GLOBULIN UR ELPH-MCNC: 2.7 GM/DL
GLUCOSE SERPL-MCNC: 116 MG/DL (ref 65–99)
GLUCOSE UR STRIP-MCNC: NEGATIVE MG/DL
HCT VFR BLD AUTO: 46.1 % (ref 34–46.6)
HGB BLD-MCNC: 15.1 G/DL (ref 12–15.9)
HGB UR QL STRIP.AUTO: NEGATIVE
HOLD SPECIMEN: NORMAL
HOLD SPECIMEN: NORMAL
HYALINE CASTS UR QL AUTO: ABNORMAL /LPF
IMM GRANULOCYTES # BLD AUTO: 0.03 10*3/MM3 (ref 0–0.05)
IMM GRANULOCYTES NFR BLD AUTO: 0.5 % (ref 0–0.5)
KETONES UR QL STRIP: NEGATIVE
LEUKOCYTE ESTERASE UR QL STRIP.AUTO: ABNORMAL
LYMPHOCYTES # BLD AUTO: 1.57 10*3/MM3 (ref 0.7–3.1)
LYMPHOCYTES NFR BLD AUTO: 23.8 % (ref 19.6–45.3)
MCH RBC QN AUTO: 30.4 PG (ref 26.6–33)
MCHC RBC AUTO-ENTMCNC: 32.8 G/DL (ref 31.5–35.7)
MCV RBC AUTO: 92.9 FL (ref 79–97)
MONOCYTES # BLD AUTO: 0.37 10*3/MM3 (ref 0.1–0.9)
MONOCYTES NFR BLD AUTO: 5.6 % (ref 5–12)
NEUTROPHILS NFR BLD AUTO: 4.51 10*3/MM3 (ref 1.7–7)
NEUTROPHILS NFR BLD AUTO: 68.4 % (ref 42.7–76)
NITRITE UR QL STRIP: NEGATIVE
NRBC BLD AUTO-RTO: 0 /100 WBC (ref 0–0.2)
PH UR STRIP.AUTO: 7.5 [PH] (ref 5–8)
PLATELET # BLD AUTO: 230 10*3/MM3 (ref 140–450)
PMV BLD AUTO: 10 FL (ref 6–12)
POTASSIUM SERPL-SCNC: 4 MMOL/L (ref 3.5–5.2)
PROT SERPL-MCNC: 6.4 G/DL (ref 6–8.5)
PROT UR QL STRIP: NEGATIVE
RBC # BLD AUTO: 4.96 10*6/MM3 (ref 3.77–5.28)
RBC # UR STRIP: ABNORMAL /HPF
REF LAB TEST METHOD: ABNORMAL
SODIUM SERPL-SCNC: 140 MMOL/L (ref 136–145)
SP GR UR STRIP: >1.03 (ref 1–1.03)
SQUAMOUS #/AREA URNS HPF: ABNORMAL /HPF
UROBILINOGEN UR QL STRIP: ABNORMAL
WBC # UR STRIP: ABNORMAL /HPF
WBC NRBC COR # BLD AUTO: 6.59 10*3/MM3 (ref 3.4–10.8)
WHOLE BLOOD HOLD COAG: NORMAL

## 2025-03-13 PROCEDURE — 36415 COLL VENOUS BLD VENIPUNCTURE: CPT

## 2025-03-13 PROCEDURE — 85025 COMPLETE CBC W/AUTO DIFF WBC: CPT

## 2025-03-13 PROCEDURE — 74177 CT ABD & PELVIS W/CONTRAST: CPT

## 2025-03-13 PROCEDURE — 85652 RBC SED RATE AUTOMATED: CPT

## 2025-03-13 PROCEDURE — 25510000001 IOPAMIDOL PER 1 ML: Performed by: EMERGENCY MEDICINE

## 2025-03-13 PROCEDURE — 99285 EMERGENCY DEPT VISIT HI MDM: CPT

## 2025-03-13 PROCEDURE — 80053 COMPREHEN METABOLIC PANEL: CPT

## 2025-03-13 PROCEDURE — 81001 URINALYSIS AUTO W/SCOPE: CPT

## 2025-03-13 PROCEDURE — 86140 C-REACTIVE PROTEIN: CPT

## 2025-03-13 RX ORDER — IOPAMIDOL 755 MG/ML
100 INJECTION, SOLUTION INTRAVASCULAR
Status: COMPLETED | OUTPATIENT
Start: 2025-03-13 | End: 2025-03-13

## 2025-03-13 RX ADMIN — IOPAMIDOL 100 ML: 755 INJECTION, SOLUTION INTRAVENOUS at 14:29

## 2025-03-13 NOTE — DISCHARGE INSTRUCTIONS
Please know that your CAT scan was within normal limits regarding your abdominal pain.  The pain you are having could be possibly a muscle strain, it could also be scar tissue from your fairly recent surgery, or could be that you are feeling things regenerate that were cut near your surgical procedure.  The CAT scan was also able to see the lower half of your lungs.  On the right lower lung there was a nodule identified.  This is an incidental finding and is not emergent.  However you should speak to your primary care about this.  You may want to have a another scan completed in 6 months to have it reevaluated and remeasured.  If it anytime however you become short of air, develop chest pain, or develop severe abdominal pain with nausea, vomiting, or diarrhea please return to emergency department.  Otherwise follow-up with your primary care provider in 3 to 5 days to be reevaluated.

## 2025-03-13 NOTE — ED PROVIDER NOTES
Time: 12:00 PM EDT  Date of encounter:  3/13/2025  Room number: 54/54  Independent Historian/Clinical History and Information was obtained by:   Patient    History is limited by: N/A    Chief Complaint: right ab pain       History of Present Illness:  Patient is a 79 y.o. year old female who presents to the emergency department for evaluation of right sided abdominal pain.  Patient states that she has had right sided abdominal burning and discomfort with movement for approximately 1 week.  She fears that this could be related to her hernia repair that she had completed 6 months ago.  The area is primarily around her navel and to the right lower area of her abdomen.  Pain is described to be the worst when she attempts to turn to her left.  She has had no nausea, vomiting, diarrhea, fever, chills.  She has also had no blood in her stool or urine.  She has had no burning with urination.  She also denies any cough or shortness of air.    HPI    Patient Care Team  Primary Care Provider: Wesley Coon MD    Past Medical History:     Allergies   Allergen Reactions    Codeine Other (See Comments)     Elevated BP, then BP dropped     Past Medical History:   Diagnosis Date    Diabetes 2014    HBP (high blood pressure)      Past Surgical History:   Procedure Laterality Date    CATARACT EXTRACTION, BILATERAL      HYSTERECTOMY  02/21/2014    KNEE ARTHROPLASTY      THYROIDECTOMY  2015     Family History   Problem Relation Age of Onset    Colon cancer Neg Hx        Home Medications:  Prior to Admission medications    Medication Sig Start Date End Date Taking? Authorizing Provider   albuterol sulfate  (90 Base) MCG/ACT inhaler INHALE 2 PUFFS BY MOUTH EVERY 4 HOURS AS NEEDED 5/17/22   Ney Nicole DO   glucose blood (Prodigy No Coding Blood Gluc) test strip TEST BLOOD SUGAR ONCE DAILY 4/27/22  Yes Ney Nicole, DO   hydroCHLOROthiazide (HYDRODIURIL) 12.5 MG tablet TAKE ONE TABLET BY MOUTH ONCE DAILY 7/11/22  Yes  "Jovita Barrow,    levothyroxine (SYNTHROID, LEVOTHROID) 150 MCG tablet  3/3/23  Yes ProviderAutumn MD   levothyroxine (SYNTHROID, LEVOTHROID) 175 MCG tablet Take 1 tablet by mouth Daily.  Patient not taking: Reported on 4/13/2023    Autumn Francois MD   losartan (COZAAR) 25 MG tablet TAKE TWO TABLETS BY MOUTH ONCE DAILY 8/9/22  Yes Ney Nicole DO   losartan (COZAAR) 50 MG tablet TAKE ONE TABLET BY MOUTH ONCE DAILY  Patient not taking: Reported on 4/13/2023 7/12/21   Ney Nicole DO   metoprolol tartrate (LOPRESSOR) 25 MG tablet TAKE ONE TABLET BY MOUTH ONCE DAILY 4/27/22  Yes Ney Nicole DO   Ozempic, 0.25 or 0.5 MG/DOSE, 2 MG/1.5ML solution pen-injector  3/23/23  Yes Autumn Francois MD   pioglitazone (ACTOS) 15 MG tablet TAKE ONE TABLET BY MOUTH ONCE DAILY  Patient taking differently: TAKE ONE TABLET BY MOUTH ONCE DAILY 10/19/21  Yes Ney Nicole DO   Tradjenta 5 MG tablet tablet TAKE ONE TABLET BY MOUTH ONCE DAILY 4/5/22   Ney Nicole DO        Social History:   Social History     Tobacco Use    Smoking status: Never    Smokeless tobacco: Never   Vaping Use    Vaping status: Never Used   Substance Use Topics    Alcohol use: Never    Drug use: Never         Review of Systems:  Review of Systems   I performed a review of systems today, which was all negative, except for the positives found in the HPI above.    Physical Exam:  /89 (BP Location: Left arm, Patient Position: Sitting)   Pulse 78   Temp 97.7 °F (36.5 °C) (Oral)   Resp 18   Ht 149.9 cm (59\")   Wt 88.7 kg (195 lb 8.8 oz)   LMP  (LMP Unknown)   SpO2 100%   BMI 39.50 kg/m²     Physical Exam   General:  Awake alert no apparent distress    Head: Normocephalic, atraumatic, eyes PERRLA EOMI, nose normal, oropharynx normal    Neck: Supple, no meningismus, no cervical lymphadenopathy or JVD    Heart: Regular rate and rhythm, no murmurs or rubs, 2+ radial pulses    Lungs: Clear to auscultation bilaterally, no wheezing " or rhonchi or rales, no respiratory distress    Abdomen: Soft, nondistended, no signs of peritonitis, minimal tenderness to right side on exam.  Deep palpation elicited before patient had discomfort.    Skin: Warm, dry, no rash    Musculoskeletal: Normal range of motion, no obvious deformities, no edema noted    Neurologic: Awake, alert, oriented x 3, no motor or sensory deficits appreciated    Psych: No suicidal or homicidal ideations, no psychosis          Procedures:  Procedures      Medical Decision Making:      Comorbidities that affect care:    Hypertension, diabetes, hernia repair    External Notes reviewed:    Previous Labs: I reviewed patient's labs completed on 2/10/2025 for comparison and trending purposes.      The following orders were placed and all results were independently analyzed by me:  Orders Placed This Encounter   Procedures    CT Abdomen Pelvis With Contrast    Comprehensive Metabolic Panel    Sedimentation Rate    C-reactive Protein    CBC Auto Differential    Urinalysis With Microscopic If Indicated (No Culture) - Urine, Clean Catch    Urinalysis, Microscopic Only - Urine, Clean Catch    CBC & Differential    Extra Tubes    Gold Top - SST    Gray Top    Extra Tubes    Light Blue Top       Medications Given in the Emergency Department:  Medications   iopamidol (ISOVUE-370) 76 % injection 100 mL (100 mL Intravenous Given 3/13/25 1429)        ED Course:    ED Course as of 03/13/25 1554   Thu Mar 13, 2025   1550 Patient does report that she recently was on her hands and knees cleaning out her pantry and that that could have been the cause of a possible muscle strain that is resulting in her pain.  We also discussed other possibilities that could be the cause of her pain.  Patient verbalizes understanding.  She confirms that she does have a primary care provider to follow-up with. [MS]      ED Course User Index  [MS] Farida Garcia APRN       Labs:    Lab Results (last 24 hours)        Procedure Component Value Units Date/Time    CBC & Differential [[id number]]  (Normal) Collected: 03/13/25 1227    Specimen: Blood Updated: 03/13/25 1234    Narrative:      The following orders were created for panel order CBC & Differential.  Procedure                               Abnormality         Status                     ---------                               -----------         ------                     CBC Auto Differential[[id number]]        Normal              Final result                 Please view results for these tests on the individual orders.    Sedimentation Rate [[id number]]  (Normal) Collected: 03/13/25 1227    Specimen: Blood Updated: 03/13/25 1241     Sed Rate 23 mm/hr     CBC Auto Differential [[id number]]  (Normal) Collected: 03/13/25 1227    Specimen: Blood Updated: 03/13/25 1234     WBC 6.59 10*3/mm3      RBC 4.96 10*6/mm3      Hemoglobin 15.1 g/dL      Hematocrit 46.1 %      MCV 92.9 fL      MCH 30.4 pg      MCHC 32.8 g/dL      RDW 13.2 %      RDW-SD 45.2 fl      MPV 10.0 fL      Platelets 230 10*3/mm3      Neutrophil % 68.4 %      Lymphocyte % 23.8 %      Monocyte % 5.6 %      Eosinophil % 1.1 %      Basophil % 0.6 %      Immature Grans % 0.5 %      Neutrophils, Absolute 4.51 10*3/mm3      Lymphocytes, Absolute 1.57 10*3/mm3      Monocytes, Absolute 0.37 10*3/mm3      Eosinophils, Absolute 0.07 10*3/mm3      Basophils, Absolute 0.04 10*3/mm3      Immature Grans, Absolute 0.03 10*3/mm3      nRBC 0.0 /100 WBC     Comprehensive Metabolic Panel [[id number]]  (Abnormal) Collected: 03/13/25 1251    Specimen: Blood Updated: 03/13/25 1325     Glucose 116 mg/dL      BUN 26 mg/dL      Creatinine 0.83 mg/dL      Sodium 140 mmol/L      Potassium 4.0 mmol/L      Chloride 104 mmol/L      CO2 25.1 mmol/L      Calcium 8.2 mg/dL      Total Protein 6.4 g/dL      Albumin 3.7 g/dL      ALT (SGPT) 21 U/L      AST (SGOT) 23 U/L      Alkaline Phosphatase 93 U/L      Total Bilirubin 0.4 mg/dL      Globulin 2.7  gm/dL      A/G Ratio 1.4 g/dL      BUN/Creatinine Ratio 31.3     Anion Gap 10.9 mmol/L      eGFR 71.8 mL/min/1.73     Narrative:      GFR Categories in Chronic Kidney Disease (CKD)      GFR Category          GFR (mL/min/1.73)    Interpretation  G1                     90 or greater         Normal or high (1)  G2                      60-89                Mild decrease (1)  G3a                   45-59                Mild to moderate decrease  G3b                   30-44                Moderate to severe decrease  G4                    15-29                Severe decrease  G5                    14 or less           Kidney failure          (1)In the absence of evidence of kidney disease, neither GFR category G1 or G2 fulfill the criteria for CKD.    eGFR calculation 2021 CKD-EPI creatinine equation, which does not include race as a factor    C-reactive Protein [[id number]]  (Normal) Collected: 03/13/25 1251    Specimen: Blood Updated: 03/13/25 1325     C-Reactive Protein <0.30 mg/dL     Urinalysis With Microscopic If Indicated (No Culture) - Urine, Clean Catch [[id number]]  (Abnormal) Collected: 03/13/25 1430    Specimen: Urine, Clean Catch Updated: 03/13/25 1455     Color, UA Yellow     Appearance, UA Clear     pH, UA 7.5     Specific Gravity, UA >1.030     Glucose, UA Negative     Ketones, UA Negative     Bilirubin, UA Negative     Blood, UA Negative     Protein, UA Negative     Leuk Esterase, UA Trace     Nitrite, UA Negative     Urobilinogen, UA 0.2 E.U./dL    Urinalysis, Microscopic Only - Urine, Clean Catch [[id number]]  (Abnormal) Collected: 03/13/25 1430    Specimen: Urine, Clean Catch Updated: 03/13/25 1455     RBC, UA 0-2 /HPF      WBC, UA 0-2 /HPF      Bacteria, UA None Seen /HPF      Squamous Epithelial Cells, UA 3-6 /HPF      Hyaline Casts, UA None Seen /LPF      Methodology Automated Microscopy             Imaging:    CT Abdomen Pelvis With Contrast  Result Date: 3/13/2025  CT ABDOMEN PELVIS W CONTRAST Date of  Exam: 3/13/2025 2:14 PM EDT Indication: pain s/p hernia repair. Comparison: None available. Technique: Axial CT images were obtained of the abdomen and pelvis after the uneventful intravenous administration of iodinated contrast. Reconstructed coronal and sagittal images were also obtained. Automated exposure control and iterative construction methods were used. Findings: LUNG BASES: There is a 14 mm solid right lower lobe pulmonary nodule (image 24, series 5). Nonemergent follow-up noncontrast chest CT recommended to evaluate the remainder of the thorax. LIVER:  Unremarkable parenchyma without focal lesion. BILIARY/GALLBLADDER: Cholecystectomy SPLEEN:  Unremarkable PANCREAS:  Unremarkable ADRENAL:  Unremarkable KIDNEYS:  Unremarkable parenchyma with no solid mass identified. No obstruction.  No calculus identified. GASTROINTESTINAL/MESENTERY:  No evidence of obstruction nor inflammation.  The appendix is normal. MESENTERIC VESSELS:  Patent. AORTA/IVC:  Normal caliber. RETROPERITONEUM/LYMPH NODES:  Unremarkable REPRODUCTIVE:  Unremarkable BLADDER:  Unremarkable OSSEUS STRUCTURES:  Typical for age with no acute process identified.     Impression: 1.No acute process in the abdomen or pelvis is identified. 2.There is a solid 14 mm right lower lobe pulmonary nodule. Nonemergent follow-up noncontrast chest CT recommended. Electronically Signed: Henok Last MD  3/13/2025 2:35 PM EDT  Workstation ID: [id number]        Differential Diagnosis and Discussion:    Abdominal Pain: Based on the patient's signs and symptoms, I considered abdominal aortic aneurysm, small bowel obstruction, pancreatitis, acute cholecystitis, acute appendecitis, peptic ulcer disease, gastritis, colitis, endocrine disorders, irritable bowel syndrome and other differential diagnosis an etiology of the patient's abdominal pain.    Labs were collected in the emergency department and all labs were reviewed and interpreted by me.  CT scan was performed in  the emergency department and the CT scan radiology impression was interpreted by me.    MDM     Amount and/or Complexity of Data Reviewed  Clinical lab tests: reviewed                   Patient Care Considerations:    SEPSIS was considered but is NOT present in the emergency department as SIRS criteria is not present. ANTIBIOTICS: I considered prescribing antibiotics as an outpatient however no bacterial focus of infection was found.      Consultants/Shared Management Plan:    None    Social Determinants of Health:    Patient is independent, reliable, and has access to care.       Disposition and Care Coordination:    Discharged: The patient is suitable and stable for discharge with no need for consideration of admission.    I have explained the patient´s condition, diagnoses and treatment plan based on the information available to me at this time. I have answered questions and addressed any concerns. The patient has a good  understanding of the patient´s diagnosis, condition, and treatment plan as can be expected at this point. The vital signs have been stable. The patient´s condition is stable and appropriate for discharge from the emergency department.      The patient will pursue further outpatient evaluation with the primary care physician or other designated or consulting physician as outlined in the discharge instructions. They are agreeable to this plan of care and follow-up instructions have been explained in detail. The patient has received these instructions in written format and has expressed an understanding of the discharge instructions. The patient is aware that any significant change in condition or worsening of symptoms should prompt an immediate return to this or the closest emergency department or call to 911.    Final diagnoses:   Periumbilical abdominal pain   Right lower quadrant abdominal pain   Pulmonary nodule (right lower lobe)         ED Disposition       ED Disposition   Discharge     Condition   Stable    Comment   --               This medical record created using voice recognition software.       Farida Garcia, APRN  03/13/25 [id number]

## 2025-05-22 ENCOUNTER — LAB (OUTPATIENT)
Dept: LAB | Facility: HOSPITAL | Age: 80
End: 2025-05-22
Payer: MEDICARE

## 2025-05-22 ENCOUNTER — TRANSCRIBE ORDERS (OUTPATIENT)
Dept: LAB | Facility: HOSPITAL | Age: 80
End: 2025-05-22
Payer: MEDICARE

## 2025-05-22 DIAGNOSIS — I10 ESSENTIAL HYPERTENSION, MALIGNANT: ICD-10-CM

## 2025-05-22 DIAGNOSIS — E78.5 HYPERLIPIDEMIA, UNSPECIFIED HYPERLIPIDEMIA TYPE: ICD-10-CM

## 2025-05-22 DIAGNOSIS — E11.65 INADEQUATELY CONTROLLED DIABETES MELLITUS: Primary | ICD-10-CM

## 2025-05-22 DIAGNOSIS — E11.65 INADEQUATELY CONTROLLED DIABETES MELLITUS: ICD-10-CM

## 2025-05-22 LAB
ALBUMIN UR-MCNC: <1.2 MG/DL
ANION GAP SERPL CALCULATED.3IONS-SCNC: 8.9 MMOL/L (ref 5–15)
BUN SERPL-MCNC: 21 MG/DL (ref 8–23)
BUN/CREAT SERPL: 22.1 (ref 7–25)
CALCIUM SPEC-SCNC: 8.7 MG/DL (ref 8.6–10.5)
CHLORIDE SERPL-SCNC: 103 MMOL/L (ref 98–107)
CHOLEST SERPL-MCNC: 154 MG/DL (ref 0–200)
CO2 SERPL-SCNC: 24.1 MMOL/L (ref 22–29)
CREAT SERPL-MCNC: 0.95 MG/DL (ref 0.57–1)
EGFRCR SERPLBLD CKD-EPI 2021: 61.1 ML/MIN/1.73
GLUCOSE SERPL-MCNC: 110 MG/DL (ref 65–99)
HBA1C MFR BLD: 6 % (ref 4.8–5.6)
HDLC SERPL-MCNC: 60 MG/DL (ref 40–60)
LDLC SERPL CALC-MCNC: 78 MG/DL (ref 0–100)
LDLC/HDLC SERPL: 1.29 {RATIO}
POTASSIUM SERPL-SCNC: 3.9 MMOL/L (ref 3.5–5.2)
SODIUM SERPL-SCNC: 136 MMOL/L (ref 136–145)
TRIGL SERPL-MCNC: 83 MG/DL (ref 0–150)
VLDLC SERPL-MCNC: 16 MG/DL (ref 5–40)

## 2025-05-22 PROCEDURE — 36415 COLL VENOUS BLD VENIPUNCTURE: CPT

## 2025-05-22 PROCEDURE — 82043 UR ALBUMIN QUANTITATIVE: CPT

## 2025-05-22 PROCEDURE — 83036 HEMOGLOBIN GLYCOSYLATED A1C: CPT

## 2025-05-22 PROCEDURE — 80048 BASIC METABOLIC PNL TOTAL CA: CPT

## 2025-05-22 PROCEDURE — 80061 LIPID PANEL: CPT
